# Patient Record
Sex: FEMALE | Race: ASIAN | NOT HISPANIC OR LATINO
[De-identification: names, ages, dates, MRNs, and addresses within clinical notes are randomized per-mention and may not be internally consistent; named-entity substitution may affect disease eponyms.]

---

## 2023-01-24 ENCOUNTER — ASOB RESULT (OUTPATIENT)
Age: 38
End: 2023-01-24

## 2023-01-24 ENCOUNTER — APPOINTMENT (OUTPATIENT)
Dept: OBGYN | Facility: CLINIC | Age: 38
End: 2023-01-24
Payer: COMMERCIAL

## 2023-01-24 VITALS
SYSTOLIC BLOOD PRESSURE: 122 MMHG | TEMPERATURE: 98.4 F | DIASTOLIC BLOOD PRESSURE: 76 MMHG | RESPIRATION RATE: 15 BRPM | BODY MASS INDEX: 22.82 KG/M2 | HEIGHT: 62 IN | WEIGHT: 124 LBS | HEART RATE: 73 BPM | OXYGEN SATURATION: 98 %

## 2023-01-24 DIAGNOSIS — Z80.7 FAMILY HISTORY OF OTHER MALIGNANT NEOPLASMS OF LYMPHOID, HEMATOPOIETIC AND RELATED TISSUES: ICD-10-CM

## 2023-01-24 PROCEDURE — 81025 URINE PREGNANCY TEST: CPT

## 2023-01-24 PROCEDURE — 36415 COLL VENOUS BLD VENIPUNCTURE: CPT

## 2023-01-24 PROCEDURE — 76817 TRANSVAGINAL US OBSTETRIC: CPT

## 2023-01-24 PROCEDURE — 0500F INITIAL PRENATAL CARE VISIT: CPT

## 2023-01-25 LAB
BASOPHILS # BLD AUTO: 0.02 K/UL
BASOPHILS NFR BLD AUTO: 0.3 %
EOSINOPHIL # BLD AUTO: 0.17 K/UL
EOSINOPHIL NFR BLD AUTO: 2.6 %
ESTIMATED AVERAGE GLUCOSE: 100 MG/DL
HBA1C MFR BLD HPLC: 5.1 %
HCG UR QL: POSITIVE
HCT VFR BLD CALC: 37.4 %
HGB BLD-MCNC: 12.7 G/DL
HIV1+2 AB SPEC QL IA.RAPID: NONREACTIVE
IMM GRANULOCYTES NFR BLD AUTO: 0.2 %
LYMPHOCYTES # BLD AUTO: 1.66 K/UL
LYMPHOCYTES NFR BLD AUTO: 25.7 %
MAN DIFF?: NORMAL
MCHC RBC-ENTMCNC: 30.5 PG
MCHC RBC-ENTMCNC: 34 GM/DL
MCV RBC AUTO: 89.9 FL
MONOCYTES # BLD AUTO: 0.47 K/UL
MONOCYTES NFR BLD AUTO: 7.3 %
NEUTROPHILS # BLD AUTO: 4.12 K/UL
NEUTROPHILS NFR BLD AUTO: 63.9 %
PLATELET # BLD AUTO: 296 K/UL
QUALITY CONTROL: YES
RBC # BLD: 4.16 M/UL
RBC # FLD: 13.3 %
WBC # FLD AUTO: 6.45 K/UL

## 2023-01-26 LAB
ABO + RH PNL BLD: NORMAL
BACTERIA UR CULT: NORMAL
BLD GP AB SCN SERPL QL: NORMAL
C TRACH RRNA SPEC QL NAA+PROBE: NOT DETECTED
HBV SURFACE AG SER QL: REACTIVE
HCV AB SER QL: NONREACTIVE
HCV S/CO RATIO: 0.63 S/CO
HGB A MFR BLD: 97.4 %
HGB A2 MFR BLD: 2.6 %
HGB FRACT BLD-IMP: NORMAL
HPV HIGH+LOW RISK DNA PNL CVX: NOT DETECTED
LEAD BLD-MCNC: <1 UG/DL
MEV IGG FLD QL IA: 18.6 AU/ML
MEV IGG+IGM SER-IMP: POSITIVE
N GONORRHOEA RRNA SPEC QL NAA+PROBE: NOT DETECTED
RUBV IGG FLD-ACNC: 1.5 INDEX
RUBV IGG SER-IMP: POSITIVE
SOURCE TP AMPLIFICATION: NORMAL
T PALLIDUM AB SER QL IA: NEGATIVE
VZV AB TITR SER: POSITIVE
VZV IGG SER IF-ACNC: 773.5 INDEX

## 2023-01-27 ENCOUNTER — TRANSCRIPTION ENCOUNTER (OUTPATIENT)
Age: 38
End: 2023-01-27

## 2023-01-30 LAB
CYTOLOGY CVX/VAG DOC THIN PREP: NORMAL
FMR1 GENE MUT ANL BLD/T: NORMAL

## 2023-01-31 LAB — AR GENE MUT ANL BLD/T: NORMAL

## 2023-02-07 ENCOUNTER — NON-APPOINTMENT (OUTPATIENT)
Age: 38
End: 2023-02-07

## 2023-02-07 ENCOUNTER — APPOINTMENT (OUTPATIENT)
Dept: OBGYN | Facility: CLINIC | Age: 38
End: 2023-02-07
Payer: COMMERCIAL

## 2023-02-07 ENCOUNTER — RESULT CHARGE (OUTPATIENT)
Age: 38
End: 2023-02-07

## 2023-02-07 VITALS
RESPIRATION RATE: 18 BRPM | BODY MASS INDEX: 23.37 KG/M2 | DIASTOLIC BLOOD PRESSURE: 60 MMHG | OXYGEN SATURATION: 98 % | HEART RATE: 62 BPM | SYSTOLIC BLOOD PRESSURE: 115 MMHG | TEMPERATURE: 97.9 F | WEIGHT: 127 LBS | HEIGHT: 62 IN

## 2023-02-07 LAB — CFTR MUT TESTED BLD/T: NEGATIVE

## 2023-02-07 PROCEDURE — 81003 URINALYSIS AUTO W/O SCOPE: CPT | Mod: QW

## 2023-02-07 PROCEDURE — 0502F SUBSEQUENT PRENATAL CARE: CPT

## 2023-02-07 PROCEDURE — 36415 COLL VENOUS BLD VENIPUNCTURE: CPT

## 2023-02-16 ENCOUNTER — ASOB RESULT (OUTPATIENT)
Age: 38
End: 2023-02-16

## 2023-02-16 ENCOUNTER — APPOINTMENT (OUTPATIENT)
Dept: ANTEPARTUM | Facility: CLINIC | Age: 38
End: 2023-02-16
Payer: COMMERCIAL

## 2023-02-16 PROCEDURE — 76801 OB US < 14 WKS SINGLE FETUS: CPT

## 2023-02-16 PROCEDURE — 76813 OB US NUCHAL MEAS 1 GEST: CPT | Mod: 59

## 2023-02-22 ENCOUNTER — APPOINTMENT (OUTPATIENT)
Dept: OBGYN | Facility: CLINIC | Age: 38
End: 2023-02-22
Payer: COMMERCIAL

## 2023-02-22 VITALS
RESPIRATION RATE: 16 BRPM | HEART RATE: 84 BPM | OXYGEN SATURATION: 96 % | BODY MASS INDEX: 23.92 KG/M2 | WEIGHT: 130 LBS | DIASTOLIC BLOOD PRESSURE: 74 MMHG | SYSTOLIC BLOOD PRESSURE: 116 MMHG | TEMPERATURE: 97.7 F | HEIGHT: 62 IN

## 2023-02-22 LAB
GLUCOSE UR-MCNC: NEGATIVE
KETONES UR-MCNC: NEGATIVE
LEUKOCYTE ESTERASE UR QL STRIP: NEGATIVE
NITRITE UR QL STRIP: NEGATIVE
PROT UR STRIP-MCNC: NEGATIVE

## 2023-02-22 PROCEDURE — 0502F SUBSEQUENT PRENATAL CARE: CPT

## 2023-03-07 ENCOUNTER — APPOINTMENT (OUTPATIENT)
Dept: OBGYN | Facility: CLINIC | Age: 38
End: 2023-03-07
Payer: COMMERCIAL

## 2023-03-07 VITALS
HEIGHT: 62 IN | WEIGHT: 129 LBS | SYSTOLIC BLOOD PRESSURE: 109 MMHG | DIASTOLIC BLOOD PRESSURE: 62 MMHG | BODY MASS INDEX: 23.74 KG/M2 | TEMPERATURE: 98 F | OXYGEN SATURATION: 98 % | RESPIRATION RATE: 18 BRPM | HEART RATE: 68 BPM

## 2023-03-07 DIAGNOSIS — Z34.91 ENCOUNTER FOR SUPERVISION OF NORMAL PREGNANCY, UNSPECIFIED, FIRST TRIMESTER: ICD-10-CM

## 2023-03-07 LAB
BILIRUB UR QL STRIP: NEGATIVE
CLARITY UR: CLEAR
COLLECTION METHOD: NORMAL
GLUCOSE UR-MCNC: NEGATIVE
HCG UR QL: 0.2 EU/DL
HGB UR QL STRIP.AUTO: NEGATIVE
KETONES UR-MCNC: NEGATIVE
LEUKOCYTE ESTERASE UR QL STRIP: NEGATIVE
NITRITE UR QL STRIP: NEGATIVE
PH UR STRIP: 7
PROT UR STRIP-MCNC: NEGATIVE
SP GR UR STRIP: 1.02

## 2023-03-07 PROCEDURE — 36415 COLL VENOUS BLD VENIPUNCTURE: CPT

## 2023-03-07 PROCEDURE — 0502F SUBSEQUENT PRENATAL CARE: CPT

## 2023-03-08 PROBLEM — Z34.91 PRENATAL CARE IN FIRST TRIMESTER: Status: RESOLVED | Noted: 2023-01-24 | Resolved: 2023-03-08

## 2023-03-10 LAB
AFP MOM: 1
AFP VALUE: 33.9 NG/ML
ALPHA FETOPROTEIN SERUM COMMENT: NORMAL
ALPHA FETOPROTEIN SERUM INTERPRETATION: NORMAL
ALPHA FETOPROTEIN SERUM RESULTS: NORMAL
ALPHA FETOPROTEIN SERUM TEST RESULTS: NORMAL
GESTATIONAL AGE BASED ON: NORMAL
GESTATIONAL AGE ON COLLECTION DATE: 15.1 WEEKS
INSULIN DEP DIABETES: NO
MATERNAL AGE AT EDD AFP: 38.6 YR
MULTIPLE GESTATION: NO
OSBR RISK 1 IN: NORMAL
RACE: NORMAL
WEIGHT AFP: 129 LBS

## 2023-03-28 ENCOUNTER — APPOINTMENT (OUTPATIENT)
Dept: HEPATOLOGY | Facility: CLINIC | Age: 38
End: 2023-03-28
Payer: COMMERCIAL

## 2023-03-28 VITALS
OXYGEN SATURATION: 96 % | TEMPERATURE: 97.9 F | HEART RATE: 66 BPM | RESPIRATION RATE: 15 BRPM | HEIGHT: 62 IN | WEIGHT: 136 LBS | DIASTOLIC BLOOD PRESSURE: 64 MMHG | SYSTOLIC BLOOD PRESSURE: 105 MMHG | BODY MASS INDEX: 25.03 KG/M2

## 2023-03-28 PROCEDURE — 99204 OFFICE O/P NEW MOD 45 MIN: CPT

## 2023-03-29 LAB
ALBUMIN SERPL ELPH-MCNC: 4.2 G/DL
ALP BLD-CCNC: 35 U/L
ALT SERPL-CCNC: 15 U/L
ANION GAP SERPL CALC-SCNC: 12 MMOL/L
AST SERPL-CCNC: 17 U/L
BASOPHILS # BLD AUTO: 0.03 K/UL
BASOPHILS NFR BLD AUTO: 0.5 %
BILIRUB DIRECT SERPL-MCNC: 0.1 MG/DL
BILIRUB INDIRECT SERPL-MCNC: 0.2 MG/DL
BILIRUB SERPL-MCNC: 0.3 MG/DL
BUN SERPL-MCNC: 8 MG/DL
CALCIUM SERPL-MCNC: 9.2 MG/DL
CHLORIDE SERPL-SCNC: 104 MMOL/L
CO2 SERPL-SCNC: 22 MMOL/L
CREAT SERPL-MCNC: 0.43 MG/DL
EGFR: 128 ML/MIN/1.73M2
EOSINOPHIL # BLD AUTO: 0.11 K/UL
EOSINOPHIL NFR BLD AUTO: 1.8 %
GLUCOSE SERPL-MCNC: 92 MG/DL
HBV CORE IGG+IGM SER QL: REACTIVE
HBV DNA # SERPL NAA+PROBE: 10 IU/ML
HBV SURFACE AB SER QL: NONREACTIVE
HBV SURFACE AG SER QL: REACTIVE
HCT VFR BLD CALC: 35.8 %
HCV AB SER QL: NONREACTIVE
HCV S/CO RATIO: 0.57 S/CO
HEPB DNA PCR INT: DETECTED
HEPB DNA PCR LOG: 1 LOGIU/ML
HGB BLD-MCNC: 12 G/DL
IMM GRANULOCYTES NFR BLD AUTO: 0.3 %
INR PPP: 0.84 RATIO
LYMPHOCYTES # BLD AUTO: 1.25 K/UL
LYMPHOCYTES NFR BLD AUTO: 20.6 %
MAN DIFF?: NORMAL
MCHC RBC-ENTMCNC: 31.4 PG
MCHC RBC-ENTMCNC: 33.5 GM/DL
MCV RBC AUTO: 93.7 FL
MONOCYTES # BLD AUTO: 0.35 K/UL
MONOCYTES NFR BLD AUTO: 5.8 %
NEUTROPHILS # BLD AUTO: 4.31 K/UL
NEUTROPHILS NFR BLD AUTO: 71 %
PLATELET # BLD AUTO: 255 K/UL
POTASSIUM SERPL-SCNC: 3.9 MMOL/L
PROT SERPL-MCNC: 6.4 G/DL
PT BLD: 9.9 SEC
RBC # BLD: 3.82 M/UL
RBC # FLD: 12.9 %
SODIUM SERPL-SCNC: 137 MMOL/L
WBC # FLD AUTO: 6.07 K/UL

## 2023-03-30 LAB
HBV E AB SER QL: REACTIVE
HBV E AG SER QL: NONREACTIVE
HCV RNA SERPL NAA+PROBE-LOG IU: NOT DETECTED LOGIU/ML
HEPC RNA INTERP: NOT DETECTED

## 2023-03-31 NOTE — PHYSICAL EXAM
[General Appearance - Alert] : alert [General Appearance - In No Acute Distress] : in no acute distress [General Appearance - Well Nourished] : well nourished [General Appearance - Well Developed] : well developed [Sclera] : the sclera and conjunctiva were normal [Oropharynx] : the oropharynx was normal [Neck Appearance] : the appearance of the neck was normal [Respiration, Rhythm And Depth] : normal respiratory rhythm and effort [Exaggerated Use Of Accessory Muscles For Inspiration] : no accessory muscle use [Auscultation Breath Sounds / Voice Sounds] : lungs were clear to auscultation bilaterally [Heart Rate And Rhythm] : heart rate was normal and rhythm regular [Heart Sounds] : normal S1 and S2 [Edema] : there was no peripheral edema [Bowel Sounds] : normal bowel sounds [Abdomen Soft] : soft [Abdomen Tenderness] : non-tender [] : no hepato-splenomegaly [Cervical Lymph Nodes Enlarged Posterior Bilaterally] : posterior cervical [Cervical Lymph Nodes Enlarged Anterior Bilaterally] : anterior cervical [Supraclavicular Lymph Nodes Enlarged Bilaterally] : supraclavicular [Axillary Lymph Nodes Enlarged Bilaterally] : axillary [No CVA Tenderness] : no ~M costovertebral angle tenderness [No Spinal Tenderness] : no spinal tenderness [Abnormal Walk] : normal gait [Skin Color & Pigmentation] : normal skin color and pigmentation [Oriented To Time, Place, And Person] : oriented to person, place, and time [Impaired Insight] : insight and judgment were intact [Affect] : the affect was normal [Mood] : the mood was normal [Scleral Icterus] : No Scleral Icterus [Spider Angioma] : No spider angioma(s) were observed [Abdominal  Ascites] : no ascites [Liver Palpable ___ Finger Breadths Below Costal Margin] : was not palpable below costal margin [Asterixis] : no asterixis observed [Jaundice] : No jaundice [Palmar Erythema] : no Palmar Erythema [Depression] : no depression [FreeTextEntry1] : Grossly intact

## 2023-03-31 NOTE — HISTORY OF PRESENT ILLNESS
[FreeTextEntry1] : 38 yo Female with Hx of chronic Hepatitis B (Dx at birth, mother has Hep B), reported that has been treated in her early 20s (for few month, was oral medication), prediabetes, prior exposure to hep C ? (now Hep C ab neg 1/24/23), was referred by Dr. Ellis for her Hep B. She is currently 18 weeks pregnant. \par First pregnancy. `s vaccination status unknown.

## 2023-03-31 NOTE — REVIEW OF SYSTEMS
[Negative] : Heme/Lymph [Fever] : no fever [Chills] : no chills [Feeling Poorly] : not feeling poorly [Feeling Tired] : not feeling tired [Dysuria] : no dysuria [Arthralgias] : no arthralgias [Itching] : no itching

## 2023-03-31 NOTE — ASSESSMENT
[FreeTextEntry1] : 36 yo Female with Hx of chronic Hepatitis B (Dx at birth, mother has Hep B), reported that has been treated in her early 20s (for few month, was oral medication), prediabetes, prior exposure to hep C ? (now Hep C ab neg 1/24/23), was referred by Dr. Ellis for her Hep B. She is currently 18 weeks pregnant. \par \par # Chronic Hep B\par - I have explained to the patient the natural history of hepatitis B virus infection including the potential progression to cirrhosis and risk of HCC. \par - I have also discussed the current FDA approved hepatitis B treatment options, drug and drug interaction, risks and benefits, side effects and criteria of the treatment.\par - Discussed that in general in a Hepatitis B e ab positive person, elevated liver enzymes (>2x ULN) along with HBV DNA viral load > 2000 IU/ml would meet criteria for treatment. Will check her HBeAb and HBeAg status. Discussed that if one does not meet standard criteria for treatment, in pregnancy the HBV DNA viral load threshold to treat in 3rd trimester is 200.000 IU/ml to reduce risk of transmission.\par \par - I have ordered blood work to check complete Hepatitis B serology, HBV DNA.\par - HIV neg (2023), HCV ab neg (2023), \par \par - Fibroscan to assess for fibrosis / cirrhosis is not feasible during pregnancy, will do after delivery.\par - AFP would not be informative during pregnancy, could be falsely elevated. Will obtain post delivery. \par - Will obtain US abd for now. \par \par - Discussed that the child will need Hep B vaccine and HBIG within 12 hrs from birth to reduce risk of transmission.\par \par - Discussed that postpartum monitoring will be important too, not just for HCC screening, but also because sometimes Hep B flare can occur post partum. \par \par - Patient has been counseled on prevention of HBV transmission, including but not limited to: not sharing toothbrushes, razors, injection equipment, glucose testing equipment, covering open cuts and scratches, using barrier protection (if sexual partner not immune), testing household and sexual contacts and vaccinating if not immune, not donating blood, organs. \par - Advised to check `s Hep B /  vaccination status. \par \par - Patient meantime try to get prior records.\par \par RTC 1 month and will repeat LFTs and VL again towards the end of 2nd trimester..

## 2023-03-31 NOTE — CONSULT LETTER
[Consult Letter:] : I had the pleasure of evaluating your patient, [unfilled]. [Consult Closing:] : Thank you very much for allowing me to participate in the care of this patient.  If you have any questions, please do not hesitate to contact me. [FreeTextEntry2] : Dr. Ramiro Ellis

## 2023-04-03 LAB — HEPATITIS D VIRUS IGM AB: NORMAL

## 2023-04-18 ENCOUNTER — APPOINTMENT (OUTPATIENT)
Dept: ANTEPARTUM | Facility: CLINIC | Age: 38
End: 2023-04-18
Payer: COMMERCIAL

## 2023-04-18 ENCOUNTER — ASOB RESULT (OUTPATIENT)
Age: 38
End: 2023-04-18

## 2023-04-18 PROCEDURE — 76811 OB US DETAILED SNGL FETUS: CPT

## 2023-04-21 ENCOUNTER — APPOINTMENT (OUTPATIENT)
Dept: PEDIATRIC CARDIOLOGY | Facility: CLINIC | Age: 38
End: 2023-04-21
Payer: COMMERCIAL

## 2023-04-21 PROCEDURE — 76820 UMBILICAL ARTERY ECHO: CPT

## 2023-04-21 PROCEDURE — 93325 DOPPLER ECHO COLOR FLOW MAPG: CPT | Mod: 59

## 2023-04-21 PROCEDURE — 76821 MIDDLE CEREBRAL ARTERY ECHO: CPT

## 2023-04-21 PROCEDURE — 76825 ECHO EXAM OF FETAL HEART: CPT

## 2023-04-21 PROCEDURE — 99203 OFFICE O/P NEW LOW 30 MIN: CPT

## 2023-04-21 PROCEDURE — 76827 ECHO EXAM OF FETAL HEART: CPT

## 2023-05-01 ENCOUNTER — APPOINTMENT (OUTPATIENT)
Dept: OBGYN | Facility: CLINIC | Age: 38
End: 2023-05-01
Payer: COMMERCIAL

## 2023-05-01 VITALS
TEMPERATURE: 98.3 F | HEART RATE: 76 BPM | DIASTOLIC BLOOD PRESSURE: 71 MMHG | SYSTOLIC BLOOD PRESSURE: 118 MMHG | WEIGHT: 139 LBS | OXYGEN SATURATION: 97 % | BODY MASS INDEX: 25.58 KG/M2 | RESPIRATION RATE: 18 BRPM | HEIGHT: 62 IN

## 2023-05-01 LAB
BILIRUB UR QL STRIP: NEGATIVE
GLUCOSE UR-MCNC: NEGATIVE
HCG UR QL: 0.2 EU/DL
HGB UR QL STRIP.AUTO: NEGATIVE
KETONES UR-MCNC: NEGATIVE
LEUKOCYTE ESTERASE UR QL STRIP: NEGATIVE
NITRITE UR QL STRIP: NEGATIVE
PH UR STRIP: 6
PROT UR STRIP-MCNC: NEGATIVE
SP GR UR STRIP: 1.02

## 2023-05-01 PROCEDURE — 0502F SUBSEQUENT PRENATAL CARE: CPT

## 2023-05-16 ENCOUNTER — ASOB RESULT (OUTPATIENT)
Age: 38
End: 2023-05-16

## 2023-05-16 ENCOUNTER — APPOINTMENT (OUTPATIENT)
Dept: ANTEPARTUM | Facility: CLINIC | Age: 38
End: 2023-05-16
Payer: COMMERCIAL

## 2023-05-16 PROCEDURE — 76816 OB US FOLLOW-UP PER FETUS: CPT

## 2023-05-24 ENCOUNTER — APPOINTMENT (OUTPATIENT)
Dept: OBGYN | Facility: CLINIC | Age: 38
End: 2023-05-24
Payer: COMMERCIAL

## 2023-05-24 VITALS
BODY MASS INDEX: 26.13 KG/M2 | HEIGHT: 62 IN | WEIGHT: 142 LBS | RESPIRATION RATE: 18 BRPM | TEMPERATURE: 97.9 F | HEART RATE: 80 BPM | DIASTOLIC BLOOD PRESSURE: 71 MMHG | OXYGEN SATURATION: 98 % | SYSTOLIC BLOOD PRESSURE: 109 MMHG

## 2023-05-24 PROCEDURE — 36415 COLL VENOUS BLD VENIPUNCTURE: CPT

## 2023-05-24 PROCEDURE — 0502F SUBSEQUENT PRENATAL CARE: CPT

## 2023-05-25 LAB — GLUCOSE 1H P 50 G GLC PO SERPL-MCNC: 112 MG/DL

## 2023-06-20 ENCOUNTER — APPOINTMENT (OUTPATIENT)
Dept: OBGYN | Facility: CLINIC | Age: 38
End: 2023-06-20
Payer: COMMERCIAL

## 2023-06-20 VITALS
TEMPERATURE: 97.2 F | WEIGHT: 147 LBS | RESPIRATION RATE: 18 BRPM | BODY MASS INDEX: 27.05 KG/M2 | SYSTOLIC BLOOD PRESSURE: 111 MMHG | OXYGEN SATURATION: 99 % | HEART RATE: 86 BPM | HEIGHT: 62 IN | DIASTOLIC BLOOD PRESSURE: 51 MMHG

## 2023-06-20 DIAGNOSIS — Z00.00 ENCOUNTER FOR GENERAL ADULT MEDICAL EXAMINATION W/OUT ABNORMAL FINDINGS: ICD-10-CM

## 2023-06-20 LAB
BILIRUB UR QL STRIP: NEGATIVE
GLUCOSE UR-MCNC: NEGATIVE
HCG UR QL: 0.2 EU/DL
HGB UR QL STRIP.AUTO: NEGATIVE
KETONES UR-MCNC: NEGATIVE
LEUKOCYTE ESTERASE UR QL STRIP: NORMAL
NITRITE UR QL STRIP: NEGATIVE
PH UR STRIP: 7
PROT UR STRIP-MCNC: NEGATIVE
SP GR UR STRIP: 1.01

## 2023-06-20 PROCEDURE — 0502F SUBSEQUENT PRENATAL CARE: CPT

## 2023-06-21 ENCOUNTER — NON-APPOINTMENT (OUTPATIENT)
Age: 38
End: 2023-06-21

## 2023-06-21 ENCOUNTER — ASOB RESULT (OUTPATIENT)
Age: 38
End: 2023-06-21

## 2023-06-21 ENCOUNTER — APPOINTMENT (OUTPATIENT)
Dept: ANTEPARTUM | Facility: CLINIC | Age: 38
End: 2023-06-21
Payer: COMMERCIAL

## 2023-06-21 PROCEDURE — 99204 OFFICE O/P NEW MOD 45 MIN: CPT

## 2023-06-21 PROCEDURE — 76819 FETAL BIOPHYS PROFIL W/O NST: CPT | Mod: 59

## 2023-06-21 PROCEDURE — 76816 OB US FOLLOW-UP PER FETUS: CPT

## 2023-06-21 PROCEDURE — 76820 UMBILICAL ARTERY ECHO: CPT | Mod: 59

## 2023-06-28 ENCOUNTER — APPOINTMENT (OUTPATIENT)
Dept: HEPATOLOGY | Facility: CLINIC | Age: 38
End: 2023-06-28

## 2023-07-05 ENCOUNTER — NON-APPOINTMENT (OUTPATIENT)
Age: 38
End: 2023-07-05

## 2023-07-05 ENCOUNTER — APPOINTMENT (OUTPATIENT)
Dept: OBGYN | Facility: CLINIC | Age: 38
End: 2023-07-05
Payer: COMMERCIAL

## 2023-07-05 VITALS
HEART RATE: 84 BPM | HEIGHT: 62 IN | TEMPERATURE: 98.1 F | RESPIRATION RATE: 18 BRPM | WEIGHT: 147 LBS | BODY MASS INDEX: 27.05 KG/M2 | SYSTOLIC BLOOD PRESSURE: 113 MMHG | OXYGEN SATURATION: 97 % | DIASTOLIC BLOOD PRESSURE: 58 MMHG

## 2023-07-05 DIAGNOSIS — Z34.92 ENCOUNTER FOR SUPERVISION OF NORMAL PREGNANCY, UNSPECIFIED, SECOND TRIMESTER: ICD-10-CM

## 2023-07-05 DIAGNOSIS — D27.1 BENIGN NEOPLASM OF LEFT OVARY: ICD-10-CM

## 2023-07-05 LAB
BILIRUB UR QL STRIP: NEGATIVE
GLUCOSE UR-MCNC: NEGATIVE
HCG UR QL: 0.2 EU/DL
HGB UR QL STRIP.AUTO: NEGATIVE
KETONES UR-MCNC: NEGATIVE
LEUKOCYTE ESTERASE UR QL STRIP: NEGATIVE
NITRITE UR QL STRIP: NEGATIVE
PH UR STRIP: 7
PROT UR STRIP-MCNC: NEGATIVE
SP GR UR STRIP: 1.01

## 2023-07-05 PROCEDURE — 0502F SUBSEQUENT PRENATAL CARE: CPT

## 2023-07-11 ENCOUNTER — APPOINTMENT (OUTPATIENT)
Dept: HEPATOLOGY | Facility: CLINIC | Age: 38
End: 2023-07-11
Payer: COMMERCIAL

## 2023-07-11 ENCOUNTER — APPOINTMENT (OUTPATIENT)
Dept: OBGYN | Facility: CLINIC | Age: 38
End: 2023-07-11
Payer: COMMERCIAL

## 2023-07-11 ENCOUNTER — NON-APPOINTMENT (OUTPATIENT)
Age: 38
End: 2023-07-11

## 2023-07-11 ENCOUNTER — ASOB RESULT (OUTPATIENT)
Age: 38
End: 2023-07-11

## 2023-07-11 VITALS
OXYGEN SATURATION: 99 % | RESPIRATION RATE: 18 BRPM | DIASTOLIC BLOOD PRESSURE: 71 MMHG | HEIGHT: 62 IN | TEMPERATURE: 98.7 F | SYSTOLIC BLOOD PRESSURE: 112 MMHG | WEIGHT: 147 LBS | HEART RATE: 82 BPM | BODY MASS INDEX: 27.05 KG/M2

## 2023-07-11 VITALS
HEIGHT: 62 IN | BODY MASS INDEX: 27.23 KG/M2 | TEMPERATURE: 98.3 F | SYSTOLIC BLOOD PRESSURE: 119 MMHG | DIASTOLIC BLOOD PRESSURE: 73 MMHG | OXYGEN SATURATION: 98 % | HEART RATE: 74 BPM | WEIGHT: 148 LBS

## 2023-07-11 LAB
BILIRUB UR QL STRIP: NEGATIVE
CLARITY UR: CLEAR
COLLECTION METHOD: NORMAL
GLUCOSE UR-MCNC: NEGATIVE
HCG UR QL: 0.2 EU/DL
HGB UR QL STRIP.AUTO: NORMAL
KETONES UR-MCNC: NEGATIVE
LEUKOCYTE ESTERASE UR QL STRIP: NEGATIVE
NITRITE UR QL STRIP: NEGATIVE
PH UR STRIP: 7
PROT UR STRIP-MCNC: NEGATIVE
SP GR UR STRIP: 1.01

## 2023-07-11 PROCEDURE — 0502F SUBSEQUENT PRENATAL CARE: CPT

## 2023-07-11 PROCEDURE — 76817 TRANSVAGINAL US OBSTETRIC: CPT

## 2023-07-11 PROCEDURE — 99214 OFFICE O/P EST MOD 30 MIN: CPT

## 2023-07-12 ENCOUNTER — ASOB RESULT (OUTPATIENT)
Age: 38
End: 2023-07-12

## 2023-07-12 ENCOUNTER — APPOINTMENT (OUTPATIENT)
Dept: ANTEPARTUM | Facility: CLINIC | Age: 38
End: 2023-07-12
Payer: COMMERCIAL

## 2023-07-12 LAB
ALBUMIN SERPL ELPH-MCNC: 3.9 G/DL
ALP BLD-CCNC: 65 U/L
ALT SERPL-CCNC: 14 U/L
ANION GAP SERPL CALC-SCNC: 11 MMOL/L
AST SERPL-CCNC: 17 U/L
BILIRUB DIRECT SERPL-MCNC: 0.1 MG/DL
BILIRUB INDIRECT SERPL-MCNC: 0.2 MG/DL
BILIRUB SERPL-MCNC: 0.3 MG/DL
BUN SERPL-MCNC: 5 MG/DL
CALCIUM SERPL-MCNC: 9.2 MG/DL
CHLORIDE SERPL-SCNC: 103 MMOL/L
CO2 SERPL-SCNC: 23 MMOL/L
CREAT SERPL-MCNC: 0.48 MG/DL
EGFR: 124 ML/MIN/1.73M2
GLUCOSE SERPL-MCNC: 91 MG/DL
HAV IGM SER QL: NONREACTIVE
HBV DNA # SERPL NAA+PROBE: <10 IU/ML
HEPATITIS A IGG ANTIBODY: REACTIVE
HEPB DNA PCR INT: DETECTED
HEPB DNA PCR LOG: <1 LOGIU/ML
INR PPP: 0.86 RATIO
POTASSIUM SERPL-SCNC: 4.4 MMOL/L
PROT SERPL-MCNC: 6 G/DL
PT BLD: 10.1 SEC
SODIUM SERPL-SCNC: 137 MMOL/L

## 2023-07-12 PROCEDURE — 76819 FETAL BIOPHYS PROFIL W/O NST: CPT | Mod: 59

## 2023-07-12 PROCEDURE — 76816 OB US FOLLOW-UP PER FETUS: CPT

## 2023-07-13 NOTE — ASSESSMENT
[FreeTextEntry1] : 38 yo Female with Hx of chronic Hepatitis B (Dx at birth, mother has Hep B), reported that has been treated in her early 20s (for few month, was oral medication), prediabetes, prior exposure to hep C ? (now Hep C ab neg 1/24/23), was referred by Dr. Ellis for her Hep B and was seen for initial visit on 3/28/23 at 18 weeks of her pregnancy (1st pregnancy). She has normal ALT, low HBV DNA. \par \par # Chronic Hep B, eAb pos.\par - I have explained to the patient the natural history of hepatitis B virus infection including the potential progression to cirrhosis and risk of HCC. \par - I have also discussed the current FDA approved hepatitis B treatment options, drug and drug interaction, risks and benefits, side effects and criteria of the treatment.\par - Discussed that in general in a Hepatitis B e ab positive person, elevated liver enzymes (>2x ULN) along with HBV DNA viral load > 2000 IU/ml would meet criteria for treatment.  Discussed that if one does not meet standard criteria for treatment, in pregnancy the HBV DNA viral load threshold to treat in 3rd trimester is 200.000 IU/ml to reduce risk of transmission. She does not meet criteria for antiviral treatment at present. \par \par - I have ordered repeat blood work to check LFTs and HBV DNA.\par - HIV neg (2023), HCV ab neg (2023), Hep D IgM neg (2023)\par - Will check Hep A immunity.\par \par - Fibroscan to assess for fibrosis / cirrhosis is not feasible during pregnancy, will do after delivery.\par - AFP would not be informative during pregnancy, could be falsely elevated. Will obtain post delivery. \par - Patient to schedule the previously ordered US abd. \par \par - Discussed that the child will need Hep B vaccine and HBIG within 12 hrs from birth to reduce risk of transmission.\par \par - Discussed that postpartum monitoring will be important too, not just for HCC screening, but also because sometimes Hep B flare can occur post partum. \par \par - Patient has been counseled on prevention of HBV transmission, including but not limited to: not sharing toothbrushes, razors, injection equipment, glucose testing equipment, covering open cuts and scratches, using barrier protection (if sexual partner not immune), testing household and sexual contacts and vaccinating if not immune, not donating blood, organs. \par - Advised to check `s Hep B /  vaccination status. \par \par - Patient still try to get prior records.\par \par \par RTC after delivery and patient to call to discuss results, as well as return earlier if change in status.

## 2023-07-13 NOTE — REASON FOR VISIT
[Initial Evaluation] : an initial evaluation [Follow-Up: _____] : a [unfilled] follow-up visit [FreeTextEntry1] : Hepatitis B

## 2023-07-13 NOTE — REVIEW OF SYSTEMS
[Negative] : Heme/Lymph [Fever] : no fever [Chills] : no chills [Feeling Poorly] : not feeling poorly [Feeling Tired] : not feeling tired [Dysuria] : no dysuria [Arthralgias] : no arthralgias [Itching] : no itching [Confused] : no confusion

## 2023-07-13 NOTE — HISTORY OF PRESENT ILLNESS
[FreeTextEntry1] : 36 yo Female with Hx of chronic Hepatitis B (Dx at birth, mother has Hep B), reported that has been treated in her early 20s (for few month, was oral medication), prediabetes, prior exposure to hep C ? (now Hep C ab neg 1/24/23), was referred by Dr. Ellis for her Hep B and was seen for initial visit on 3/28/23 at 18 weeks of her pregnancy (1st pregnancy). \par \par Labs 3/28/23 showed normal CBC, normal ALT (15), low ALP (35), low HBV DNA  10IU/ml, HBeAb pos, Hep D IgM neg, HCV ab neg and HIV neg (1/24/23). \par \par She is here for follow up. Reports feeling well, no new complaints. She has not done the US abd yet. She is at 33 weeks of pregnancy. \par

## 2023-07-19 ENCOUNTER — NON-APPOINTMENT (OUTPATIENT)
Age: 38
End: 2023-07-19

## 2023-07-19 ENCOUNTER — APPOINTMENT (OUTPATIENT)
Dept: OBGYN | Facility: CLINIC | Age: 38
End: 2023-07-19
Payer: COMMERCIAL

## 2023-07-19 VITALS
SYSTOLIC BLOOD PRESSURE: 108 MMHG | HEIGHT: 62 IN | WEIGHT: 148 LBS | TEMPERATURE: 98.11 F | OXYGEN SATURATION: 98 % | DIASTOLIC BLOOD PRESSURE: 68 MMHG | RESPIRATION RATE: 18 BRPM | HEART RATE: 105 BPM | BODY MASS INDEX: 27.23 KG/M2

## 2023-07-19 LAB
BILIRUB UR QL STRIP: NEGATIVE
CLARITY UR: CLEAR
COLLECTION METHOD: NORMAL
GLUCOSE UR-MCNC: NEGATIVE
HCG UR QL: 0.2 EU/DL
HGB UR QL STRIP.AUTO: NEGATIVE
KETONES UR-MCNC: NORMAL
LEUKOCYTE ESTERASE UR QL STRIP: NORMAL
NITRITE UR QL STRIP: NEGATIVE
PH UR STRIP: 6.5
PROT UR STRIP-MCNC: NEGATIVE
SP GR UR STRIP: 1.02

## 2023-07-19 PROCEDURE — 0502F SUBSEQUENT PRENATAL CARE: CPT

## 2023-07-31 ENCOUNTER — APPOINTMENT (OUTPATIENT)
Dept: OBGYN | Facility: CLINIC | Age: 38
End: 2023-07-31
Payer: COMMERCIAL

## 2023-07-31 VITALS
RESPIRATION RATE: 18 BRPM | OXYGEN SATURATION: 97 % | DIASTOLIC BLOOD PRESSURE: 56 MMHG | TEMPERATURE: 97.9 F | BODY MASS INDEX: 27.42 KG/M2 | HEART RATE: 89 BPM | HEIGHT: 62 IN | SYSTOLIC BLOOD PRESSURE: 102 MMHG | WEIGHT: 149 LBS

## 2023-07-31 LAB
BILIRUB UR QL STRIP: NEGATIVE
GLUCOSE UR-MCNC: NEGATIVE
HCG UR QL: 0.2 EU/DL
HGB UR QL STRIP.AUTO: NEGATIVE
KETONES UR-MCNC: NEGATIVE
LEUKOCYTE ESTERASE UR QL STRIP: NEGATIVE
NITRITE UR QL STRIP: NEGATIVE
PH UR STRIP: 7.5
PROT UR STRIP-MCNC: NEGATIVE
SP GR UR STRIP: 1.02

## 2023-07-31 PROCEDURE — 36415 COLL VENOUS BLD VENIPUNCTURE: CPT

## 2023-07-31 PROCEDURE — 0502F SUBSEQUENT PRENATAL CARE: CPT

## 2023-08-08 ENCOUNTER — ASOB RESULT (OUTPATIENT)
Age: 38
End: 2023-08-08

## 2023-08-08 ENCOUNTER — APPOINTMENT (OUTPATIENT)
Dept: ANTEPARTUM | Facility: CLINIC | Age: 38
End: 2023-08-08
Payer: COMMERCIAL

## 2023-08-08 PROCEDURE — 76816 OB US FOLLOW-UP PER FETUS: CPT

## 2023-08-09 LAB
C TRACH RRNA SPEC QL NAA+PROBE: NOT DETECTED
GP B STREP DNA SPEC QL NAA+PROBE: NOT DETECTED
HIV1+2 AB SPEC QL IA.RAPID: NONREACTIVE
N GONORRHOEA RRNA SPEC QL NAA+PROBE: NOT DETECTED
SOURCE AMPLIFICATION: NORMAL
SOURCE GBS: NORMAL
T PALLIDUM AB SER QL IA: NEGATIVE

## 2023-08-10 ENCOUNTER — NON-APPOINTMENT (OUTPATIENT)
Age: 38
End: 2023-08-10

## 2023-08-10 ENCOUNTER — APPOINTMENT (OUTPATIENT)
Dept: OBGYN | Facility: CLINIC | Age: 38
End: 2023-08-10
Payer: COMMERCIAL

## 2023-08-10 VITALS
HEIGHT: 62 IN | WEIGHT: 151 LBS | RESPIRATION RATE: 18 BRPM | TEMPERATURE: 98.2 F | SYSTOLIC BLOOD PRESSURE: 114 MMHG | HEART RATE: 87 BPM | BODY MASS INDEX: 27.79 KG/M2 | OXYGEN SATURATION: 97 % | DIASTOLIC BLOOD PRESSURE: 74 MMHG

## 2023-08-10 PROCEDURE — 0502F SUBSEQUENT PRENATAL CARE: CPT

## 2023-08-11 LAB
BILIRUB UR QL STRIP: NEGATIVE
CLARITY UR: CLEAR
COLLECTION METHOD: NORMAL
GLUCOSE UR-MCNC: NEGATIVE
HCG UR QL: 0.2 EU/DL
HGB UR QL STRIP.AUTO: NEGATIVE
KETONES UR-MCNC: NEGATIVE
LEUKOCYTE ESTERASE UR QL STRIP: NEGATIVE
NITRITE UR QL STRIP: NEGATIVE
PH UR STRIP: 7.5
PROT UR STRIP-MCNC: NEGATIVE
SP GR UR STRIP: 1.02

## 2023-08-15 ENCOUNTER — APPOINTMENT (OUTPATIENT)
Dept: ULTRASOUND IMAGING | Facility: CLINIC | Age: 38
End: 2023-08-15
Payer: COMMERCIAL

## 2023-08-15 ENCOUNTER — OUTPATIENT (OUTPATIENT)
Dept: OUTPATIENT SERVICES | Facility: HOSPITAL | Age: 38
LOS: 1 days | End: 2023-08-15
Payer: COMMERCIAL

## 2023-08-15 DIAGNOSIS — B18.1 CHRONIC VIRAL HEPATITIS B WITHOUT DELTA-AGENT: ICD-10-CM

## 2023-08-15 PROCEDURE — 76700 US EXAM ABDOM COMPLETE: CPT

## 2023-08-15 PROCEDURE — 76700 US EXAM ABDOM COMPLETE: CPT | Mod: 26

## 2023-08-16 ENCOUNTER — APPOINTMENT (OUTPATIENT)
Dept: OBGYN | Facility: CLINIC | Age: 38
End: 2023-08-16
Payer: COMMERCIAL

## 2023-08-16 ENCOUNTER — ASOB RESULT (OUTPATIENT)
Age: 38
End: 2023-08-16

## 2023-08-16 VITALS
HEIGHT: 62 IN | DIASTOLIC BLOOD PRESSURE: 70 MMHG | TEMPERATURE: 98.1 F | BODY MASS INDEX: 27.97 KG/M2 | OXYGEN SATURATION: 97 % | WEIGHT: 152 LBS | RESPIRATION RATE: 18 BRPM | HEART RATE: 78 BPM | SYSTOLIC BLOOD PRESSURE: 106 MMHG

## 2023-08-16 LAB
BILIRUB UR QL STRIP: NEGATIVE
GLUCOSE UR-MCNC: NEGATIVE
HCG UR QL: 0.2 EU/DL
HGB UR QL STRIP.AUTO: NORMAL
KETONES UR-MCNC: NEGATIVE
LEUKOCYTE ESTERASE UR QL STRIP: NEGATIVE
NITRITE UR QL STRIP: NEGATIVE
PH UR STRIP: 7
PROT UR STRIP-MCNC: NEGATIVE
SP GR UR STRIP: 1.01

## 2023-08-16 PROCEDURE — 0502F SUBSEQUENT PRENATAL CARE: CPT

## 2023-08-16 PROCEDURE — 76818 FETAL BIOPHYS PROFILE W/NST: CPT

## 2023-08-21 ENCOUNTER — NON-APPOINTMENT (OUTPATIENT)
Age: 38
End: 2023-08-21

## 2023-08-21 ENCOUNTER — APPOINTMENT (OUTPATIENT)
Dept: OBGYN | Facility: CLINIC | Age: 38
End: 2023-08-21
Payer: COMMERCIAL

## 2023-08-21 ENCOUNTER — ASOB RESULT (OUTPATIENT)
Age: 38
End: 2023-08-21

## 2023-08-21 VITALS
SYSTOLIC BLOOD PRESSURE: 105 MMHG | WEIGHT: 150 LBS | TEMPERATURE: 98.2 F | HEART RATE: 77 BPM | HEIGHT: 62 IN | BODY MASS INDEX: 27.6 KG/M2 | DIASTOLIC BLOOD PRESSURE: 68 MMHG | RESPIRATION RATE: 18 BRPM | OXYGEN SATURATION: 97 %

## 2023-08-21 LAB
BILIRUB UR QL STRIP: NEGATIVE
GLUCOSE UR-MCNC: NEGATIVE
HCG UR QL: 0.2 EU/DL
HGB UR QL STRIP.AUTO: NEGATIVE
KETONES UR-MCNC: NEGATIVE
LEUKOCYTE ESTERASE UR QL STRIP: NORMAL
NITRITE UR QL STRIP: NEGATIVE
PH UR STRIP: 7.5
PROT UR STRIP-MCNC: NEGATIVE
SP GR UR STRIP: 1.01

## 2023-08-21 PROCEDURE — 76818 FETAL BIOPHYS PROFILE W/NST: CPT

## 2023-08-21 PROCEDURE — 0502F SUBSEQUENT PRENATAL CARE: CPT

## 2023-08-24 ENCOUNTER — TRANSCRIPTION ENCOUNTER (OUTPATIENT)
Age: 38
End: 2023-08-24

## 2023-08-24 ENCOUNTER — INPATIENT (INPATIENT)
Facility: HOSPITAL | Age: 38
LOS: 3 days | Discharge: HOME | End: 2023-08-28
Attending: OBSTETRICS & GYNECOLOGY | Admitting: OBSTETRICS & GYNECOLOGY
Payer: COMMERCIAL

## 2023-08-24 DIAGNOSIS — Z34.80 ENCOUNTER FOR SUPERVISION OF OTHER NORMAL PREGNANCY, UNSPECIFIED TRIMESTER: ICD-10-CM

## 2023-08-24 DIAGNOSIS — Z3A.00 WEEKS OF GESTATION OF PREGNANCY NOT SPECIFIED: ICD-10-CM

## 2023-08-24 DIAGNOSIS — O26.899 OTHER SPECIFIED PREGNANCY RELATED CONDITIONS, UNSPECIFIED TRIMESTER: ICD-10-CM

## 2023-08-24 LAB
APTT BLD: 31.4 SEC — SIGNIFICANT CHANGE UP (ref 24.5–35.6)
BASOPHILS # BLD AUTO: 0.02 K/UL — SIGNIFICANT CHANGE UP (ref 0–0.2)
BASOPHILS NFR BLD AUTO: 0.3 % — SIGNIFICANT CHANGE UP (ref 0–2)
EOSINOPHIL # BLD AUTO: 0.08 K/UL — SIGNIFICANT CHANGE UP (ref 0–0.5)
EOSINOPHIL NFR BLD AUTO: 1.2 % — SIGNIFICANT CHANGE UP (ref 0–6)
FIBRINOGEN PPP-MCNC: 325 MG/DL — SIGNIFICANT CHANGE UP (ref 200–475)
HCT VFR BLD CALC: 38.2 % — SIGNIFICANT CHANGE UP (ref 34.5–45)
HGB BLD-MCNC: 12.8 G/DL — SIGNIFICANT CHANGE UP (ref 11.5–15.5)
IMM GRANULOCYTES NFR BLD AUTO: 0.4 % — SIGNIFICANT CHANGE UP (ref 0–0.9)
INR BLD: 0.78 RATIO — LOW (ref 0.85–1.18)
LYMPHOCYTES # BLD AUTO: 1.11 K/UL — SIGNIFICANT CHANGE UP (ref 1–3.3)
LYMPHOCYTES # BLD AUTO: 16.6 % — SIGNIFICANT CHANGE UP (ref 13–44)
MCHC RBC-ENTMCNC: 30.5 PG — SIGNIFICANT CHANGE UP (ref 27–34)
MCHC RBC-ENTMCNC: 33.5 GM/DL — SIGNIFICANT CHANGE UP (ref 32–36)
MCV RBC AUTO: 91.2 FL — SIGNIFICANT CHANGE UP (ref 80–100)
MONOCYTES # BLD AUTO: 0.4 K/UL — SIGNIFICANT CHANGE UP (ref 0–0.9)
MONOCYTES NFR BLD AUTO: 6 % — SIGNIFICANT CHANGE UP (ref 2–14)
NEUTROPHILS # BLD AUTO: 5.04 K/UL — SIGNIFICANT CHANGE UP (ref 1.8–7.4)
NEUTROPHILS NFR BLD AUTO: 75.5 % — SIGNIFICANT CHANGE UP (ref 43–77)
NRBC # BLD: 0 /100 WBCS — SIGNIFICANT CHANGE UP (ref 0–0)
PLATELET # BLD AUTO: 172 K/UL — SIGNIFICANT CHANGE UP (ref 150–400)
PROTHROM AB SERPL-ACNC: 9 SEC — LOW (ref 9.5–13)
RBC # BLD: 4.19 M/UL — SIGNIFICANT CHANGE UP (ref 3.8–5.2)
RBC # FLD: 13 % — SIGNIFICANT CHANGE UP (ref 10.3–14.5)
WBC # BLD: 6.68 K/UL — SIGNIFICANT CHANGE UP (ref 3.8–10.5)
WBC # FLD AUTO: 6.68 K/UL — SIGNIFICANT CHANGE UP (ref 3.8–10.5)

## 2023-08-24 RX ORDER — SODIUM CHLORIDE 9 MG/ML
1000 INJECTION, SOLUTION INTRAVENOUS
Refills: 0 | Status: DISCONTINUED | OUTPATIENT
Start: 2023-08-24 | End: 2023-08-25

## 2023-08-24 RX ORDER — CHLORHEXIDINE GLUCONATE 213 G/1000ML
1 SOLUTION TOPICAL DAILY
Refills: 0 | Status: DISCONTINUED | OUTPATIENT
Start: 2023-08-24 | End: 2023-08-25

## 2023-08-24 RX ORDER — CITRIC ACID/SODIUM CITRATE 300-500 MG
15 SOLUTION, ORAL ORAL EVERY 6 HOURS
Refills: 0 | Status: DISCONTINUED | OUTPATIENT
Start: 2023-08-24 | End: 2023-08-25

## 2023-08-25 VITALS
SYSTOLIC BLOOD PRESSURE: 112 MMHG | HEART RATE: 103 BPM | OXYGEN SATURATION: 99 % | DIASTOLIC BLOOD PRESSURE: 73 MMHG | RESPIRATION RATE: 19 BRPM | TEMPERATURE: 100 F

## 2023-08-25 LAB — T PALLIDUM AB TITR SER: NEGATIVE — SIGNIFICANT CHANGE UP

## 2023-08-25 DEVICE — BLLN CERVICAL RIPENING 18FR 40CM: Type: IMPLANTABLE DEVICE | Status: FUNCTIONAL

## 2023-08-25 RX ORDER — DIPHENHYDRAMINE HCL 50 MG
25 CAPSULE ORAL EVERY 6 HOURS
Refills: 0 | Status: DISCONTINUED | OUTPATIENT
Start: 2023-08-25 | End: 2023-08-28

## 2023-08-25 RX ORDER — OXYCODONE HYDROCHLORIDE 5 MG/1
5 TABLET ORAL EVERY 4 HOURS
Refills: 0 | Status: DISCONTINUED | OUTPATIENT
Start: 2023-08-25 | End: 2023-08-28

## 2023-08-25 RX ORDER — SODIUM CHLORIDE 9 MG/ML
1000 INJECTION, SOLUTION INTRAVENOUS
Refills: 0 | Status: DISCONTINUED | OUTPATIENT
Start: 2023-08-25 | End: 2023-08-28

## 2023-08-25 RX ORDER — FOLIC ACID 0.8 MG
1 TABLET ORAL DAILY
Refills: 0 | Status: DISCONTINUED | OUTPATIENT
Start: 2023-08-25 | End: 2023-08-28

## 2023-08-25 RX ORDER — AMPICILLIN TRIHYDRATE 250 MG
CAPSULE ORAL
Refills: 0 | Status: DISCONTINUED | OUTPATIENT
Start: 2023-08-25 | End: 2023-08-25

## 2023-08-25 RX ORDER — KETOROLAC TROMETHAMINE 30 MG/ML
30 SYRINGE (ML) INJECTION EVERY 6 HOURS
Refills: 0 | Status: DISCONTINUED | OUTPATIENT
Start: 2023-08-25 | End: 2023-08-26

## 2023-08-25 RX ORDER — AMPICILLIN TRIHYDRATE 250 MG
1 CAPSULE ORAL EVERY 4 HOURS
Refills: 0 | Status: DISCONTINUED | OUTPATIENT
Start: 2023-08-25 | End: 2023-08-25

## 2023-08-25 RX ORDER — HEPARIN SODIUM 5000 [USP'U]/ML
5000 INJECTION INTRAVENOUS; SUBCUTANEOUS EVERY 12 HOURS
Refills: 0 | Status: DISCONTINUED | OUTPATIENT
Start: 2023-08-26 | End: 2023-08-28

## 2023-08-25 RX ORDER — AZITHROMYCIN 500 MG/1
500 TABLET, FILM COATED ORAL ONCE
Refills: 0 | Status: DISCONTINUED | OUTPATIENT
Start: 2023-08-25 | End: 2023-08-25

## 2023-08-25 RX ORDER — MAGNESIUM HYDROXIDE 400 MG/1
30 TABLET, CHEWABLE ORAL
Refills: 0 | Status: DISCONTINUED | OUTPATIENT
Start: 2023-08-25 | End: 2023-08-28

## 2023-08-25 RX ORDER — IBUPROFEN 200 MG
600 TABLET ORAL EVERY 6 HOURS
Refills: 0 | Status: COMPLETED | OUTPATIENT
Start: 2023-08-25 | End: 2024-07-23

## 2023-08-25 RX ORDER — CEFAZOLIN SODIUM 1 G
2000 VIAL (EA) INJECTION ONCE
Refills: 0 | Status: COMPLETED | OUTPATIENT
Start: 2023-08-25 | End: 2023-08-25

## 2023-08-25 RX ORDER — LANOLIN
1 OINTMENT (GRAM) TOPICAL EVERY 6 HOURS
Refills: 0 | Status: DISCONTINUED | OUTPATIENT
Start: 2023-08-25 | End: 2023-08-28

## 2023-08-25 RX ORDER — CITRIC ACID/SODIUM CITRATE 300-500 MG
30 SOLUTION, ORAL ORAL ONCE
Refills: 0 | Status: COMPLETED | OUTPATIENT
Start: 2023-08-25 | End: 2023-08-25

## 2023-08-25 RX ORDER — OXYTOCIN 10 UNIT/ML
VIAL (ML) INJECTION
Qty: 30 | Refills: 0 | Status: DISCONTINUED | OUTPATIENT
Start: 2023-08-25 | End: 2023-08-25

## 2023-08-25 RX ORDER — TETANUS TOXOID, REDUCED DIPHTHERIA TOXOID AND ACELLULAR PERTUSSIS VACCINE, ADSORBED 5; 2.5; 8; 8; 2.5 [IU]/.5ML; [IU]/.5ML; UG/.5ML; UG/.5ML; UG/.5ML
0.5 SUSPENSION INTRAMUSCULAR ONCE
Refills: 0 | Status: DISCONTINUED | OUTPATIENT
Start: 2023-08-25 | End: 2023-08-28

## 2023-08-25 RX ORDER — AMPICILLIN TRIHYDRATE 250 MG
2 CAPSULE ORAL ONCE
Refills: 0 | Status: COMPLETED | OUTPATIENT
Start: 2023-08-25 | End: 2023-08-25

## 2023-08-25 RX ORDER — FERROUS SULFATE 325(65) MG
325 TABLET ORAL DAILY
Refills: 0 | Status: DISCONTINUED | OUTPATIENT
Start: 2023-08-25 | End: 2023-08-28

## 2023-08-25 RX ORDER — FAMOTIDINE 10 MG/ML
20 INJECTION INTRAVENOUS ONCE
Refills: 0 | Status: COMPLETED | OUTPATIENT
Start: 2023-08-25 | End: 2023-08-25

## 2023-08-25 RX ORDER — SIMETHICONE 80 MG/1
80 TABLET, CHEWABLE ORAL EVERY 4 HOURS
Refills: 0 | Status: DISCONTINUED | OUTPATIENT
Start: 2023-08-25 | End: 2023-08-28

## 2023-08-25 RX ORDER — OXYTOCIN 10 UNIT/ML
333.33 VIAL (ML) INJECTION
Qty: 20 | Refills: 0 | Status: DISCONTINUED | OUTPATIENT
Start: 2023-08-25 | End: 2023-08-28

## 2023-08-25 RX ORDER — ACETAMINOPHEN 500 MG
1000 TABLET ORAL ONCE
Refills: 0 | Status: COMPLETED | OUTPATIENT
Start: 2023-08-25 | End: 2023-08-25

## 2023-08-25 RX ORDER — ACETAMINOPHEN 500 MG
975 TABLET ORAL EVERY 6 HOURS
Refills: 0 | Status: DISCONTINUED | OUTPATIENT
Start: 2023-08-25 | End: 2023-08-28

## 2023-08-25 RX ADMIN — Medication 2 MILLIUNIT(S)/MIN: at 11:08

## 2023-08-25 RX ADMIN — Medication 1000 MILLIUNIT(S)/MIN: at 21:11

## 2023-08-25 RX ADMIN — Medication 1000 MILLIGRAM(S): at 23:41

## 2023-08-25 RX ADMIN — Medication 208 GRAM(S): at 14:00

## 2023-08-25 RX ADMIN — Medication 208 GRAM(S): at 18:10

## 2023-08-25 RX ADMIN — Medication 200 GRAM(S): at 10:06

## 2023-08-25 RX ADMIN — Medication 30 MILLILITER(S): at 20:15

## 2023-08-25 RX ADMIN — Medication 400 MILLIGRAM(S): at 23:21

## 2023-08-25 RX ADMIN — FAMOTIDINE 20 MILLIGRAM(S): 10 INJECTION INTRAVENOUS at 20:15

## 2023-08-25 RX ADMIN — SODIUM CHLORIDE 125 MILLILITER(S): 9 INJECTION, SOLUTION INTRAVENOUS at 02:15

## 2023-08-25 RX ADMIN — Medication 100 MILLIGRAM(S): at 20:30

## 2023-08-26 ENCOUNTER — TRANSCRIPTION ENCOUNTER (OUTPATIENT)
Age: 38
End: 2023-08-26

## 2023-08-26 DIAGNOSIS — Z98.891 HISTORY OF UTERINE SCAR FROM PREVIOUS SURGERY: ICD-10-CM

## 2023-08-26 LAB
BASOPHILS # BLD AUTO: 0.03 K/UL — SIGNIFICANT CHANGE UP (ref 0–0.2)
BASOPHILS NFR BLD AUTO: 0.2 % — SIGNIFICANT CHANGE UP (ref 0–2)
EOSINOPHIL # BLD AUTO: 0.03 K/UL — SIGNIFICANT CHANGE UP (ref 0–0.5)
EOSINOPHIL NFR BLD AUTO: 0.2 % — SIGNIFICANT CHANGE UP (ref 0–6)
HCT VFR BLD CALC: 33.4 % — LOW (ref 34.5–45)
HGB BLD-MCNC: 11.3 G/DL — LOW (ref 11.5–15.5)
IMM GRANULOCYTES NFR BLD AUTO: 0.4 % — SIGNIFICANT CHANGE UP (ref 0–0.9)
LYMPHOCYTES # BLD AUTO: 0.98 K/UL — LOW (ref 1–3.3)
LYMPHOCYTES # BLD AUTO: 7.9 % — LOW (ref 13–44)
MCHC RBC-ENTMCNC: 31 PG — SIGNIFICANT CHANGE UP (ref 27–34)
MCHC RBC-ENTMCNC: 33.8 GM/DL — SIGNIFICANT CHANGE UP (ref 32–36)
MCV RBC AUTO: 91.5 FL — SIGNIFICANT CHANGE UP (ref 80–100)
MONOCYTES # BLD AUTO: 0.61 K/UL — SIGNIFICANT CHANGE UP (ref 0–0.9)
MONOCYTES NFR BLD AUTO: 4.9 % — SIGNIFICANT CHANGE UP (ref 2–14)
NEUTROPHILS # BLD AUTO: 10.74 K/UL — HIGH (ref 1.8–7.4)
NEUTROPHILS NFR BLD AUTO: 86.4 % — HIGH (ref 43–77)
NRBC # BLD: 0 /100 WBCS — SIGNIFICANT CHANGE UP (ref 0–0)
PLATELET # BLD AUTO: 189 K/UL — SIGNIFICANT CHANGE UP (ref 150–400)
RBC # BLD: 3.65 M/UL — LOW (ref 3.8–5.2)
RBC # FLD: 13.2 % — SIGNIFICANT CHANGE UP (ref 10.3–14.5)
WBC # BLD: 12.44 K/UL — HIGH (ref 3.8–10.5)
WBC # FLD AUTO: 12.44 K/UL — HIGH (ref 3.8–10.5)

## 2023-08-26 RX ORDER — SENNOSIDES/DOCUSATE SODIUM 8.6MG-50MG
2 TABLET ORAL
Qty: 28 | Refills: 0
Start: 2023-08-26 | End: 2023-09-08

## 2023-08-26 RX ORDER — SIMETHICONE 80 MG/1
1 TABLET, CHEWABLE ORAL
Qty: 16 | Refills: 0
Start: 2023-08-26 | End: 2023-08-29

## 2023-08-26 RX ORDER — IBUPROFEN 200 MG
600 TABLET ORAL EVERY 6 HOURS
Refills: 0 | Status: DISCONTINUED | OUTPATIENT
Start: 2023-08-26 | End: 2023-08-28

## 2023-08-26 RX ORDER — IBUPROFEN 200 MG
1 TABLET ORAL
Qty: 28 | Refills: 0
Start: 2023-08-26 | End: 2023-09-01

## 2023-08-26 RX ORDER — ACETAMINOPHEN 500 MG
3 TABLET ORAL
Qty: 84 | Refills: 0
Start: 2023-08-26 | End: 2023-09-01

## 2023-08-26 RX ORDER — FERROUS SULFATE 325(65) MG
1 TABLET ORAL
Qty: 30 | Refills: 1
Start: 2023-08-26 | End: 2023-10-24

## 2023-08-26 RX ORDER — ASCORBIC ACID 60 MG
1 TABLET,CHEWABLE ORAL
Qty: 30 | Refills: 0
Start: 2023-08-26 | End: 2023-09-24

## 2023-08-26 RX ADMIN — Medication 30 MILLIGRAM(S): at 07:00

## 2023-08-26 RX ADMIN — Medication 325 MILLIGRAM(S): at 11:55

## 2023-08-26 RX ADMIN — Medication 30 MILLIGRAM(S): at 17:43

## 2023-08-26 RX ADMIN — Medication 30 MILLIGRAM(S): at 06:33

## 2023-08-26 RX ADMIN — HEPARIN SODIUM 5000 UNIT(S): 5000 INJECTION INTRAVENOUS; SUBCUTANEOUS at 22:09

## 2023-08-26 RX ADMIN — Medication 975 MILLIGRAM(S): at 23:00

## 2023-08-26 RX ADMIN — Medication 30 MILLIGRAM(S): at 11:55

## 2023-08-26 RX ADMIN — Medication 600 MILLIGRAM(S): at 23:00

## 2023-08-26 RX ADMIN — SIMETHICONE 80 MILLIGRAM(S): 80 TABLET, CHEWABLE ORAL at 11:55

## 2023-08-26 RX ADMIN — Medication 30 MILLIGRAM(S): at 12:33

## 2023-08-26 RX ADMIN — Medication 1 MILLIGRAM(S): at 11:55

## 2023-08-26 RX ADMIN — Medication 1 TABLET(S): at 11:55

## 2023-08-26 RX ADMIN — Medication 975 MILLIGRAM(S): at 22:00

## 2023-08-26 RX ADMIN — SIMETHICONE 80 MILLIGRAM(S): 80 TABLET, CHEWABLE ORAL at 22:09

## 2023-08-26 RX ADMIN — Medication 30 MILLIGRAM(S): at 17:24

## 2023-08-26 RX ADMIN — Medication 975 MILLIGRAM(S): at 08:44

## 2023-08-26 RX ADMIN — Medication 975 MILLIGRAM(S): at 14:50

## 2023-08-26 RX ADMIN — HEPARIN SODIUM 5000 UNIT(S): 5000 INJECTION INTRAVENOUS; SUBCUTANEOUS at 08:43

## 2023-08-26 RX ADMIN — Medication 975 MILLIGRAM(S): at 14:01

## 2023-08-26 RX ADMIN — Medication 600 MILLIGRAM(S): at 22:00

## 2023-08-26 RX ADMIN — Medication 975 MILLIGRAM(S): at 09:30

## 2023-08-26 NOTE — PROGRESS NOTE ADULT - PROBLEM SELECTOR PLAN 1
A/P: 36yo admitted for miol for PROM POD #1 s/p STAT primary c/s @ 39.4wks for category III tracing, AMA, IVF pregnancy, pt stable  -Pain management as needed  -DVT ppx: OOB and ambulate, heparin   -f/u Rpt CBC   - f/u trial of void  -Advance diet to regular  -Encourage breastfeeding   -pt seen and evaluated with dr. jackson  -d/w dr. barrientos A/P: 38yo admitted for miol for PROM POD #1 s/p STAT primary c/s @ 39.4wks for category III tracing, AMA, IVF pregnancy, hepB reactive being followed by hepatology outpatient, pt stable  -Pain management as needed  -DVT ppx: OOB and ambulate, heparin   -f/u Rpt CBC   - f/u trial of void  -Advance diet to regular  -Encourage breastfeeding   -follow up with hepatologist outpatient   -pt seen and evaluated with dr. jackson  -d/w dr. barrientos

## 2023-08-26 NOTE — DISCHARGE NOTE OB - CARE PROVIDER_API CALL
Ramiro Ellis  Obstetrics and Gynecology  [address], [address]  [place] [address]  Phone: [phone]  Fax: [phone]  Follow Up Time: 2 weeks

## 2023-08-26 NOTE — DISCHARGE NOTE OB - HOSPITAL COURSE
36yo admitted for miol for PROM s/p STAT primary c/s @39.4wks for category III tracing, uncomplicated delivery and postartpum care, pt stable

## 2023-08-26 NOTE — CHART NOTE - NSCHARTNOTEFT_GEN_A_CORE
Patient seen at bedside, resting comfortably offers no new complaints.  Denies HA, CP, SOB, N/V/D, dizziness, palpitations, worsening abdominal pain, worsening vaginal bleeding, or any other concerns.      Vital Signs Last 24 Hrs  T(C): 36.7 (26 Aug 2023 00:59), Max: 37.5 (25 Aug 2023 21:40)  T(F): 98.1 (26 Aug 2023 00:59), Max: 99.5 (25 Aug 2023 21:40)  HR: 72 (26 Aug 2023 00:59) (72 - 103)  BP: 107/57 (26 Aug 2023 00:59) (105/53 - 131/58)  BP(mean): 68 (25 Aug 2023 23:40) (68 - 83)  RR: 18 (26 Aug 2023 00:59) (18 - 21)  SpO2: 95% (26 Aug 2023 00:59) (95% - 99%)    Parameters below as of 26 Aug 2023 00:59  Patient On (Oxygen Delivery Method): room air    Gen: A&O x 3, NAD  Chest: CTA B/L  Cardiac: S1, S2  RRR  Breast: Soft, nontender, nonengorged  Abdomen: +BS; soft; NT; ND; Dressing C/D/I  Gyn: Minimal lochia  Ext: Nontender, DTRS 2+, no worsening edema, venodynes intact                          12.8   6.68  )-----------( 172      ( 24 Aug 2023 11:44 )             38.2       A/P: POD #1 s/p primary c/s. Cat 2       -Pain management as needed  -Advance as per protocol  -f/u CBC   -DC paredes f/u void 12hrs postop  -Advance diet with flatus  -Encourage breastfeeding and incentive spirometer use  -DVT prophylaxis   -d/w Dr Gibson, attending on call

## 2023-08-26 NOTE — DISCHARGE NOTE OB - PATIENT PORTAL LINK FT
You can access the FollowMyHealth Patient Portal offered by [facility] by registering at the following website: [email or web address]. By joining [facility]’s FollowMyHealth portal, you will also be able to view your health information using other applications (apps) compatible with our system.

## 2023-08-26 NOTE — DISCHARGE NOTE OB - PHYSICIAN SECTION COMPLETE
Physical Therapy  Facility/Department: [facility] 5T ORTHO/NEURO  Daily Treatment Note/Discharge Summary   NAME: Hernán Crabtree  : 1971  MRN: [id number]    Date of Service: 2020    Discharge Recommendations:    Hernán Crabtree scored a 19/24 on the AM-PAC short mobility form. Current research shows that an AM-PAC score of 18 or greater is typically associated with a discharge to the patient's home setting. Based on the patient's AM-PAC score and their current functional mobility deficits, it is recommended that the patient have 2-3 sessions per week of Physical Therapy at d/c to increase the patient's independence. At this time, this patient demonstrates the endurance and safety to discharge home with A.     PT Equipment Recommendations  Equipment Needed: Yes  Mobility Devices: Marlon Denton: Rolling    Assessment   Assessment: Pt with improved functional mobility and endurance this session. Pt SBA/supervison for transfers, amb with RW and stair negotiation with HR. Pt planning to d/c home with A from sister. Pt reporting no safety concerns about d/c plan. Pt with no further acute PT needs at this time will sign off from PT services. Patient Education: Role of PT. Pt verbalized partial understanding and would benefit from ongoing education. Barriers to Learning: none  REQUIRES PT FOLLOW UP: No  Activity Tolerance  Activity Tolerance: Patient Tolerated treatment well     Patient Diagnosis(es): There were no encounter diagnoses. has a past medical history of Pulmonary embolism ([address] has a past surgical history that includes Cholecystectomy and craniotomy (Left, 2020). Restrictions  Position Activity Restriction  Other position/activity restrictions: Up with assist  Subjective   General  Chart Reviewed: Yes  Additional Pertinent Hx: Pt to [facility] ED  with stroke like symptoms. Head CT: (+) Left frontal lobe ICH. CTA head/neck: no stenosis, cerebral aneurysm or AVM.   Transferred to ICU at [facility] for Neurosurgery. 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 PMH:  None per chart  Family / Caregiver Present: No  General Comment  Comments: Pt found sitting up in chair upon arrival, denying pain and agreeable to therapy. Orientation  Orientation  Overall Orientation Status: Within Functional Limits  Objective      Transfers  Sit to Stand: Supervision  Stand to sit: Supervision  Ambulation  Ambulation?: Yes  Ambulation 1  Surface: level tile  Device: Rolling Walker  Assistance: Stand by assistance  Quality of Gait: mod-quick beba, mod stride length and mod Malgorzata. Overall steady with no LOB or near LOB.   Distance: 800'  Comments: patient with flat affect, cues for safe use of walker  Stairs/Curb  Stairs?: Yes  Stairs  # Steps : 24(2x12)  Stairs Height: 6\"  Rails: Right ascending  Device: No Device  Assistance: Stand by assistance  Comment: safe and steady           AM-PAC Score  AM-PAC Inpatient Mobility Raw Score : 19 (09/24/20 0925)  AM-PAC Inpatient T-Scale Score : 45.44 (09/24/20 0925)  Mobility Inpatient CMS 0-100% Score: 41.77 (09/24/20 0925)  Mobility Inpatient CMS G-Code Modifier : CK (09/24/20 0925)          Goals  Short term goals  Time Frame for Short term goals: Discharge  Short term goal 1: supine <> sit independent - ongoing  Short term goal 2: sit <> stand modified independent - ongoing  Short term goal 3: ambulate 150ft with/without assistive device supervision - Met 9/24  Short term goal 4: ambulate up/down flight of steps with rails CGA - Met 9/24  Patient Goals   Patient goals : Return home    Plan    Plan  Times per week: d/c acute PT  Current Treatment Recommendations: Transfer Training, Gait Training, Functional Mobility Training, Strengthening  Safety Devices  Type of devices: Gait belt, Nurse notified, Call light within reach, Left in chair, Chair alarm in place     Therapy Time   Individual Concurrent Group Co-treatment   Time In [address] [facility] [address]         Time Out 4388 Minutes 23           Timed Code Treatment Minutes:  23    Total Treatment Minutes:  23    Tessa Marroquin, PT, DPT Yes

## 2023-08-26 NOTE — DISCHARGE NOTE OB - CARE PLAN
Principal Discharge DX:	Status post primary low transverse  section  Assessment and plan of treatment:	Continue breastfeeding.  Motrin as needed for pain.  Ambulate daily.  No heavy lifting or anything per vagina x 6 weeks - no sex, tampons, douching, tub baths, etc.  Follow up in office in 1-2 weeks for incision check, and then at 6 weeks for postpartum check.   1

## 2023-08-26 NOTE — PROGRESS NOTE ADULT - ASSESSMENT
A/P: 36yo admitted for miol for PROM POD #1 s/p STAT primary c/s @ 39.4wks for category III tracing, AMA, IVF pregnancy, pt stable  -Pain management as needed  -DVT ppx: OOB and ambulate, heparin   -f/u Rpt CBC   - f/u trial of void  -Advance diet to regular  -Encourage breastfeeding   -pt seen and evaluated with dr. jackson  -d/w dr. barrientos  A/P: 36yo admitted for miol for PROM POD #1 s/p STAT primary c/s @ 39.4wks for category III tracing, AMA, IVF pregnancy, hepB reactive being followed by hepatology outpatient, pt stable  -Pain management as needed  -DVT ppx: OOB and ambulate, heparin   -f/u Rpt CBC   - f/u trial of void  -Advance diet to regular  -Encourage breastfeeding   -follow up with hepatologist outpatient   -pt seen and evaluated with dr. jackson  -d/w dr. barrientos

## 2023-08-26 NOTE — DISCHARGE NOTE OB - FINDINGS/TREATMENT
fetus Mastoid Interpolation Flap Division And Inset Text: Division and inset of the mastoid interpolation flap was performed to achieve optimal aesthetic result, restore normal anatomic appearance and avoid distortion of normal anatomy, expedite and facilitate wound healing, achieve optimal functional result and because linear closure either not possible or would produce suboptimal result. The patient was prepped and draped in the usual manner. The pedicle was infiltrated with local anesthesia. The pedicle was sectioned with a #15 blade. The pedicle was de-bulked and trimmed to match the shape of the defect. Hemostasis was achieved. The flap donor site and free margin of the flap were secured with deep buried sutures and the wound edges were re-approximated.

## 2023-08-26 NOTE — DISCHARGE NOTE OB - MEDICATION SUMMARY - MEDICATIONS TO TAKE
I will START or STAY ON the medications listed below when I get home from the hospital:    ibuprofen 600 mg oral tablet  -- 1 tab(s) by mouth every 6 hours as needed for  moderate pain  -- Indication: For moderate pain    acetaminophen 325 mg oral capsule  -- 3 cap(s) by mouth every 6 hours as needed for  mild pain  -- Indication: For mild pain    Prenatal Multivitamins with Folic Acid 0.4 mg oral capsule  -- 1 tab(s) by mouth once a day  -- Indication: For breast feeding    ferrous sulfate 325 mg (65 mg elemental iron) oral tablet  -- 1 tab(s) by mouth once a day  -- Indication: For anemia    Senna Plus 50 mg-8.6 mg oral tablet  -- 2 tab(s) by mouth once a day (at bedtime) as needed for  constipation  -- Indication: For constipation    simethicone 80 mg oral tablet, chewable  -- 1 tab(s) chewed every 6 hours as needed for  indigestion as needed for gas pain  -- Indication: For gas pain    ascorbic acid 500 mg oral capsule  -- 1 cap(s) by mouth once a day  -- Indication: For anemia

## 2023-08-26 NOTE — DISCHARGE NOTE OB - MOOD SWINGS / DEPRESSION OR CRYING SPELLS LASTING MORE THAN 3 DAYS
Reason for Admission:   Patient came to ed for complaint of sob with lower extremity edema. RUR Score:     9%                  Plan for utilizing home health:  She has not used home health services or inpatient rehab in the past.        PCP: First and Last name:  Sukumar Colvin NP     Name of Practice:  [facility]. Are you a current patient: Yes/No:  Yes     Approximate date of last visit: 12/2019     Can you participate in a virtual visit with your PCP:  No                      Current Advanced Directive/Advance Care Plan:  Not on file. Her daughter states she does not have one and they would like to discuss before filling out a form. Transition of Care Plan:  Patient lives in one story home with her daughter. Her daughter does not work. The patient is able to feed herself. The daughter does the cooking and assists her mother with adl;s. She does not use home oxygen or cpap. Per daughter the patient has bad knees and does not walk. She goes from her bed to the bedside commode which is next to her bed only. Her daughter cooks and brings the food to her. Per daughter and patient are willing to have home health/rehab if needed. Nursing has paged md to ask for pt/ot consults. Care Management Interventions  PCP Verified by CM: Yes  Transition of Care Consult (CM Consult): Discharge Planning  Discharge Durable Medical Equipment: (Patient has bedside commode. She has walker , wheelchair and cane at home. )  Physical Therapy Consult: Yes  Occupational Therapy Consult: Yes  Current Support Network: Other(Her daughter lives with her. )  Confirm Follow Up Transport: Family  Discharge Location  Discharge Placement: Home        Amira WHITEN [employer]        [phone] Statement Selected

## 2023-08-27 LAB
BASOPHILS # BLD AUTO: 0.02 K/UL — SIGNIFICANT CHANGE UP (ref 0–0.2)
BASOPHILS NFR BLD AUTO: 0.2 % — SIGNIFICANT CHANGE UP (ref 0–2)
EOSINOPHIL # BLD AUTO: 0.08 K/UL — SIGNIFICANT CHANGE UP (ref 0–0.5)
EOSINOPHIL NFR BLD AUTO: 0.9 % — SIGNIFICANT CHANGE UP (ref 0–6)
HCT VFR BLD CALC: 30.8 % — LOW (ref 34.5–45)
HGB BLD-MCNC: 10.3 G/DL — LOW (ref 11.5–15.5)
IMM GRANULOCYTES NFR BLD AUTO: 0.5 % — SIGNIFICANT CHANGE UP (ref 0–0.9)
LYMPHOCYTES # BLD AUTO: 0.98 K/UL — LOW (ref 1–3.3)
LYMPHOCYTES # BLD AUTO: 10.5 % — LOW (ref 13–44)
MCHC RBC-ENTMCNC: 30.6 PG — SIGNIFICANT CHANGE UP (ref 27–34)
MCHC RBC-ENTMCNC: 33.4 GM/DL — SIGNIFICANT CHANGE UP (ref 32–36)
MCV RBC AUTO: 91.4 FL — SIGNIFICANT CHANGE UP (ref 80–100)
MONOCYTES # BLD AUTO: 0.45 K/UL — SIGNIFICANT CHANGE UP (ref 0–0.9)
MONOCYTES NFR BLD AUTO: 4.8 % — SIGNIFICANT CHANGE UP (ref 2–14)
NEUTROPHILS # BLD AUTO: 7.71 K/UL — HIGH (ref 1.8–7.4)
NEUTROPHILS NFR BLD AUTO: 83.1 % — HIGH (ref 43–77)
NRBC # BLD: 0 /100 WBCS — SIGNIFICANT CHANGE UP (ref 0–0)
PLATELET # BLD AUTO: 184 K/UL — SIGNIFICANT CHANGE UP (ref 150–400)
RBC # BLD: 3.37 M/UL — LOW (ref 3.8–5.2)
RBC # FLD: 13.5 % — SIGNIFICANT CHANGE UP (ref 10.3–14.5)
WBC # BLD: 9.29 K/UL — SIGNIFICANT CHANGE UP (ref 3.8–10.5)
WBC # FLD AUTO: 9.29 K/UL — SIGNIFICANT CHANGE UP (ref 3.8–10.5)

## 2023-08-27 RX ORDER — SENNA PLUS 8.6 MG/1
1 TABLET ORAL
Refills: 0 | Status: DISCONTINUED | OUTPATIENT
Start: 2023-08-27 | End: 2023-08-28

## 2023-08-27 RX ADMIN — Medication 600 MILLIGRAM(S): at 05:20

## 2023-08-27 RX ADMIN — Medication 1 MILLIGRAM(S): at 12:34

## 2023-08-27 RX ADMIN — Medication 600 MILLIGRAM(S): at 06:20

## 2023-08-27 RX ADMIN — Medication 975 MILLIGRAM(S): at 05:20

## 2023-08-27 RX ADMIN — Medication 975 MILLIGRAM(S): at 22:49

## 2023-08-27 RX ADMIN — Medication 600 MILLIGRAM(S): at 12:35

## 2023-08-27 RX ADMIN — HEPARIN SODIUM 5000 UNIT(S): 5000 INJECTION INTRAVENOUS; SUBCUTANEOUS at 21:51

## 2023-08-27 RX ADMIN — Medication 975 MILLIGRAM(S): at 06:20

## 2023-08-27 RX ADMIN — Medication 975 MILLIGRAM(S): at 16:04

## 2023-08-27 RX ADMIN — Medication 600 MILLIGRAM(S): at 13:35

## 2023-08-27 RX ADMIN — Medication 1 TABLET(S): at 12:34

## 2023-08-27 RX ADMIN — Medication 975 MILLIGRAM(S): at 16:56

## 2023-08-27 RX ADMIN — Medication 975 MILLIGRAM(S): at 21:49

## 2023-08-27 RX ADMIN — Medication 325 MILLIGRAM(S): at 12:34

## 2023-08-27 RX ADMIN — MAGNESIUM HYDROXIDE 30 MILLILITER(S): 400 TABLET, CHEWABLE ORAL at 08:45

## 2023-08-27 RX ADMIN — HEPARIN SODIUM 5000 UNIT(S): 5000 INJECTION INTRAVENOUS; SUBCUTANEOUS at 09:48

## 2023-08-27 RX ADMIN — SIMETHICONE 80 MILLIGRAM(S): 80 TABLET, CHEWABLE ORAL at 08:45

## 2023-08-27 NOTE — PROGRESS NOTE ADULT - PROBLEM SELECTOR PLAN 1
-Pain management as needed  -cont post op care  -OOB and ambulate  -encourage insentive spirometer use  -Encourage breastfeeding   -d/w dr Gotti

## 2023-08-27 NOTE — PROGRESS NOTE ADULT - SUBJECTIVE AND OBJECTIVE BOX
Patient seen at bedside resting comfortably offers no new complaints. + Ambulation, + void without difficulty, + flatus;  no bm; tolerating regular diet. both breastfeeding and bottle feeding. Denies HA, blurry vision or epigastric pain, CP, SOB, N/V/D,  dizziness, palpitations, worsening vaginal bleeding.    Vital Signs Last 24 Hrs  T(C): 36.7 (27 Aug 2023 06:00), Max: 37.2 (26 Aug 2023 09:13)  T(F): 98 (27 Aug 2023 06:00), Max: 99 (26 Aug 2023 22:44)  HR: 69 (27 Aug 2023 06:00) (69 - 78)  BP: 106/67 (27 Aug 2023 06:00) (93/58 - 110/70)  BP(mean): --  RR: 18 (27 Aug 2023 06:00) (18 - 18)  SpO2: 99% (27 Aug 2023 06:00) (97% - 99%)    Parameters below as of 27 Aug 2023 06:00  Patient On (Oxygen Delivery Method): room air    Gen: A&O x 3, NAD  Chest: CTABL  Cardiac: S1, S2, RRR  Breast: Soft, nontender, nonengorged  Abdomen: +BS; soft; Nontender, nondistended, Incision C/D/I steri strips in place   Gyn: Minimal lochia  Extremities: Nontender, DTRS 2+, no worsening edema                          11.3   12.44 )-----------( 189      ( 26 Aug 2023 06:00 )             33.4

## 2023-08-28 ENCOUNTER — NON-APPOINTMENT (OUTPATIENT)
Age: 38
End: 2023-08-28

## 2023-08-28 ENCOUNTER — APPOINTMENT (OUTPATIENT)
Dept: OBGYN | Facility: CLINIC | Age: 38
End: 2023-08-28

## 2023-08-28 VITALS
DIASTOLIC BLOOD PRESSURE: 75 MMHG | TEMPERATURE: 98 F | HEART RATE: 62 BPM | SYSTOLIC BLOOD PRESSURE: 125 MMHG | OXYGEN SATURATION: 96 % | RESPIRATION RATE: 18 BRPM

## 2023-08-28 PROCEDURE — G0463: CPT

## 2023-08-28 PROCEDURE — 59025 FETAL NON-STRESS TEST: CPT

## 2023-08-28 PROCEDURE — 59050 FETAL MONITOR W/REPORT: CPT

## 2023-08-28 PROCEDURE — 59510 CESAREAN DELIVERY: CPT

## 2023-08-28 PROCEDURE — C1726: CPT

## 2023-08-28 PROCEDURE — 85025 COMPLETE CBC W/AUTO DIFF WBC: CPT

## 2023-08-28 PROCEDURE — 86901 BLOOD TYPING SEROLOGIC RH(D): CPT

## 2023-08-28 PROCEDURE — 85384 FIBRINOGEN ACTIVITY: CPT

## 2023-08-28 PROCEDURE — 36415 COLL VENOUS BLD VENIPUNCTURE: CPT

## 2023-08-28 PROCEDURE — 85610 PROTHROMBIN TIME: CPT

## 2023-08-28 PROCEDURE — 86850 RBC ANTIBODY SCREEN: CPT

## 2023-08-28 PROCEDURE — 86923 COMPATIBILITY TEST ELECTRIC: CPT

## 2023-08-28 PROCEDURE — 86780 TREPONEMA PALLIDUM: CPT

## 2023-08-28 PROCEDURE — 85730 THROMBOPLASTIN TIME PARTIAL: CPT

## 2023-08-28 PROCEDURE — 86900 BLOOD TYPING SEROLOGIC ABO: CPT

## 2023-08-28 RX ADMIN — Medication 975 MILLIGRAM(S): at 10:00

## 2023-08-28 RX ADMIN — Medication 600 MILLIGRAM(S): at 01:02

## 2023-08-28 RX ADMIN — Medication 600 MILLIGRAM(S): at 06:50

## 2023-08-28 RX ADMIN — Medication 975 MILLIGRAM(S): at 09:17

## 2023-08-28 RX ADMIN — SENNA PLUS 1 TABLET(S): 8.6 TABLET ORAL at 06:50

## 2023-08-28 RX ADMIN — MAGNESIUM HYDROXIDE 30 MILLILITER(S): 400 TABLET, CHEWABLE ORAL at 09:13

## 2023-08-28 RX ADMIN — Medication 1 MILLIGRAM(S): at 11:40

## 2023-08-28 RX ADMIN — SIMETHICONE 80 MILLIGRAM(S): 80 TABLET, CHEWABLE ORAL at 11:41

## 2023-08-28 RX ADMIN — Medication 600 MILLIGRAM(S): at 07:27

## 2023-08-28 RX ADMIN — Medication 325 MILLIGRAM(S): at 11:40

## 2023-08-28 RX ADMIN — SIMETHICONE 80 MILLIGRAM(S): 80 TABLET, CHEWABLE ORAL at 06:59

## 2023-08-28 RX ADMIN — Medication 1 TABLET(S): at 11:40

## 2023-08-28 RX ADMIN — Medication 600 MILLIGRAM(S): at 00:02

## 2023-08-28 NOTE — PROGRESS NOTE ADULT - PROBLEM SELECTOR PLAN 1
A/P: POD #3 s/p c/s     -d/c home   -instructions verbalized  -follow up in 1-2weeks in office  -d/w dr. vira adler

## 2023-08-28 NOTE — PROGRESS NOTE ADULT - ASSESSMENT
A/P: POD #3 s/p c/s     -d/c home   -instructions verbalized  -follow up in 1-2weeks in officen  -d/w dr. vira adler

## 2023-08-28 NOTE — LACTATION INITIAL EVALUATION - LACTATION INTERVENTIONS
pt. has been breast and formula feeding as baby had decrease in output yesterday per pt.; baby reported to be voiding and passing stool adequately this morning; taught hand expression to provide her EHM as Supplement as needed; assisted with positioning and latch techniques to achieve deep latch and prevent sore nipples and improve milk transfer; Breastfeeding on cue 8-12X/24 hours with diaper count to assess for adequate intake, safe skin to skin and rooming-in encouraged./initiate/review safe skin-to-skin/initiate/review hand expression/initiate/review techniques for position and latch/review techniques to increase milk supply/review techniques to manage sore nipples/engorgement/reviewed components of an effective feeding and at least 8 effective feedings per day required/reviewed importance of monitoring infant diapers, the breastfeeding log, and minimum output each day/reviewed benefits and recommendations for rooming in/reviewed feeding on demand/by cue at least 8 times a day/reviewed indications of inadequate milk transfer that would require supplementation
pt. scheduled for d/c home today with baby; attended mother-baby breastfeeding and d/c class this morning with baby and FOB; pt's questions regarding basic self post-c/s care and basic  care and feedings addressed; pt. provided with inst. for safe formula feeding/prep inst. as pt. is Supplementing with formula; Referred to [facility] Tele-lactation program for breastfeeding assessment f/u and support post-discharge ; pt. verbalized understanding of education provided/initiate/review safe skin-to-skin/initiate/review hand expression/initiate/review techniques for position and latch/post discharge community resources provided/review techniques to increase milk supply/review techniques to manage sore nipples/engorgement/reviewed components of an effective feeding and at least 8 effective feedings per day required/reviewed importance of monitoring infant diapers, the breastfeeding log, and minimum output each day/reviewed benefits and recommendations for rooming in/reviewed feeding on demand/by cue at least 8 times a day/reviewed indications of inadequate milk transfer that would require supplementation

## 2023-08-28 NOTE — PROGRESS NOTE ADULT - SUBJECTIVE AND OBJECTIVE BOX
Patient seen at bedisde resting comfortably offers no new complaints. + Ambulation, + void without difficulty, + flatus; +bm;  tolerating regular diet. both breastfeeding and bottle feeding. Denies HA, CP, SOB, N/V/D,  dizziness, palpitations, worsening abdominal pain, worsening vaginal bleeding, or any other concerns.     Vital Signs Last 24 Hrs  T(C): 36.8 (28 Aug 2023 06:19), Max: 36.8 (28 Aug 2023 06:19)  T(F): 98.2 (28 Aug 2023 06:19), Max: 98.2 (28 Aug 2023 06:19)  HR: 62 (28 Aug 2023 06:19) (62 - 81)  BP: 125/75 (28 Aug 2023 06:19) (112/73 - 125/75)  BP(mean): --  RR: 18 (28 Aug 2023 06:19) (18 - 18)  SpO2: 96% (28 Aug 2023 06:19) (96% - 98%)    Parameters below as of 28 Aug 2023 06:19  Patient On (Oxygen Delivery Method): room air        Gen: A&O x 3, NAD  Chest: CTA B/L  Cardiac: S1,S2  RRR  Breast: Soft, nontender, nonengorged  Abdomen: +BS; soft; Nontender, nondistended, dressing removed Incision C/D/I steri strips in place   Gyn: Minimal lochia  Extremities: Nontender, DTRS 2+, no worsening edema                          10.3   9.29  )-----------( 184      ( 27 Aug 2023 12:38 )             30.8       A/P: POD #3 s/p c/s     -d/c home   -instructions verbalized  -follow up in 1-2weeks in [place]  -d/w dr. vira adler

## 2023-08-28 NOTE — LACTATION INITIAL EVALUATION - AS DELIV COMPLICATIONS OB
IVF pregnancy; fetal bradycardia; see CPN/premature rupture of membranes prior to labor
IVF pregnancy; fetal bradycardia; see CPN/premature rupture of membranes prior to labor

## 2023-08-28 NOTE — PROGRESS NOTE ADULT - PROBLEM SELECTOR PROBLEM 1
Status post primary low transverse  section

## 2023-08-28 NOTE — LACTATION INITIAL EVALUATION - NS LACT CON REASON FOR REQ
c/o sore, painful nipples/primaparous mom
general questions without assessment/follow up consultation

## 2023-09-12 ENCOUNTER — APPOINTMENT (OUTPATIENT)
Dept: OBGYN | Facility: CLINIC | Age: 38
End: 2023-09-12
Payer: COMMERCIAL

## 2023-09-12 VITALS
WEIGHT: 137 LBS | HEIGHT: 62 IN | TEMPERATURE: 98.2 F | BODY MASS INDEX: 25.21 KG/M2 | OXYGEN SATURATION: 96 % | DIASTOLIC BLOOD PRESSURE: 84 MMHG | RESPIRATION RATE: 18 BRPM | HEART RATE: 70 BPM | SYSTOLIC BLOOD PRESSURE: 118 MMHG

## 2023-09-12 DIAGNOSIS — Z98.891 ENCOUNTER FOR ROUTINE POSTPARTUM FOLLOW-UP: ICD-10-CM

## 2023-09-12 DIAGNOSIS — N93.9 ABNORMAL UTERINE AND VAGINAL BLEEDING, UNSPECIFIED: ICD-10-CM

## 2023-09-12 DIAGNOSIS — O09.519 SUPERVISION OF ELDERLY PRIMIGRAVIDA, UNSPECIFIED TRIMESTER: ICD-10-CM

## 2023-09-12 DIAGNOSIS — O09.819 SUPERVISION OF PREGNANCY RESULTING FROM ASSISTED REPRODUCTIVE TECHNOLOGY, UNSPECIFIED TRIMESTER: ICD-10-CM

## 2023-09-12 DIAGNOSIS — Z34.93 ENCOUNTER FOR SUPERVISION OF NORMAL PREGNANCY, UNSPECIFIED, THIRD TRIMESTER: ICD-10-CM

## 2023-09-12 PROCEDURE — 0503F POSTPARTUM CARE VISIT: CPT

## 2023-10-11 ENCOUNTER — APPOINTMENT (OUTPATIENT)
Dept: OBGYN | Facility: CLINIC | Age: 38
End: 2023-10-11
Payer: COMMERCIAL

## 2023-10-11 VITALS
RESPIRATION RATE: 16 BRPM | OXYGEN SATURATION: 99 % | DIASTOLIC BLOOD PRESSURE: 84 MMHG | WEIGHT: 137 LBS | BODY MASS INDEX: 25.21 KG/M2 | HEIGHT: 62 IN | HEART RATE: 66 BPM | TEMPERATURE: 97.2 F | SYSTOLIC BLOOD PRESSURE: 126 MMHG

## 2023-10-11 PROCEDURE — 0503F POSTPARTUM CARE VISIT: CPT

## 2024-01-12 ENCOUNTER — OUTPATIENT (OUTPATIENT)
Dept: OUTPATIENT SERVICES | Facility: HOSPITAL | Age: 39
LOS: 1 days | End: 2024-01-12
Payer: COMMERCIAL

## 2024-01-12 ENCOUNTER — APPOINTMENT (OUTPATIENT)
Dept: ULTRASOUND IMAGING | Facility: CLINIC | Age: 39
End: 2024-01-12
Payer: COMMERCIAL

## 2024-01-12 DIAGNOSIS — B18.1 CHRONIC VIRAL HEPATITIS B WITHOUT DELTA-AGENT: ICD-10-CM

## 2024-01-12 PROCEDURE — 76700 US EXAM ABDOM COMPLETE: CPT

## 2024-01-12 PROCEDURE — 76700 US EXAM ABDOM COMPLETE: CPT | Mod: 26

## 2024-01-23 ENCOUNTER — APPOINTMENT (OUTPATIENT)
Dept: OBGYN | Facility: CLINIC | Age: 39
End: 2024-01-23
Payer: COMMERCIAL

## 2024-01-23 VITALS
DIASTOLIC BLOOD PRESSURE: 74 MMHG | SYSTOLIC BLOOD PRESSURE: 124 MMHG | HEIGHT: 62 IN | HEART RATE: 55 BPM | BODY MASS INDEX: 24.11 KG/M2 | TEMPERATURE: 97.5 F | OXYGEN SATURATION: 96 % | WEIGHT: 131 LBS | RESPIRATION RATE: 16 BRPM

## 2024-01-23 DIAGNOSIS — L65.9 NONSCARRING HAIR LOSS, UNSPECIFIED: ICD-10-CM

## 2024-01-23 DIAGNOSIS — R21 RASH AND OTHER NONSPECIFIC SKIN ERUPTION: ICD-10-CM

## 2024-01-23 PROCEDURE — 0502F SUBSEQUENT PRENATAL CARE: CPT

## 2024-01-23 PROCEDURE — 99213 OFFICE O/P EST LOW 20 MIN: CPT

## 2024-01-23 RX ORDER — CLOTRIMAZOLE AND BETAMETHASONE DIPROPIONATE 10; .5 MG/G; MG/G
1-0.05 CREAM TOPICAL TWICE DAILY
Qty: 1 | Refills: 1 | Status: ACTIVE | COMMUNITY
Start: 2024-01-23 | End: 1900-01-01

## 2024-01-30 LAB
25(OH)D3 SERPL-MCNC: 50.9 NG/ML
TSH SERPL-ACNC: 1.14 UIU/ML

## 2024-02-01 ENCOUNTER — APPOINTMENT (OUTPATIENT)
Dept: OBGYN | Facility: CLINIC | Age: 39
End: 2024-02-01
Payer: COMMERCIAL

## 2024-02-01 PROCEDURE — 99451 NTRPROF PH1/NTRNET/EHR 5/>: CPT

## 2024-02-04 NOTE — HISTORY OF PRESENT ILLNESS
[FreeTextEntry1] : Patient present with complaints of breast rash and itching. patient reports pumping at this time. Patient reports being prescribed clobetasol cream and said that her PCP stated the cream may be too strong for her to use long term and referred her back to OBGYN. Patient denies nipple cracking or bleeding. Patient also reports increased hair loss similar to when she was postpartum.

## 2024-02-04 NOTE — PLAN
[FreeTextEntry1] : Fungal infection vs contact dermatitis  eRx Clotrimazole-betamethasone  Follow up via TTM for lab results and for reassessment of symptoms

## 2024-02-09 NOTE — PROGRESS NOTE ADULT - SUBJECTIVE AND OBJECTIVE BOX
Patient seen at bedside resting comfortably offers no new complaints. not yet ambulating, paredes in place.  + flatus;  no bowel movement; tolerating clear liquid diet.  Pt both breast and bottle feeding. Pt denies headache, weakness, chest pain, shortness of breath, blurry vision or epigastric pain, N/V/D,  palpitations, worsening vaginal bleeding.    Vital Signs Last 24 Hrs  T(C): 36.3 (26 Aug 2023 04:35), Max: 37.5 (25 Aug 2023 21:40)  T(F): 97.4 (26 Aug 2023 04:35), Max: 99.5 (25 Aug 2023 21:40)  HR: 80 (26 Aug 2023 04:35) (72 - 103)  BP: 110/60 (26 Aug 2023 04:35) (105/53 - 131/58)  BP(mean): 68 (25 Aug 2023 23:40) (68 - 83)  RR: 18 (26 Aug 2023 04:35) (18 - 21)  SpO2: 95% (26 Aug 2023 04:35) (95% - 99%)    Parameters below as of 26 Aug 2023 04:35  Patient On (Oxygen Delivery Method): room air        Gen: A&O x 3, NAD  Chest: CTA B/L  Cardiac: S1,S2  RRR  Breast: Soft, nontender, nonengorged  Abdomen: +BS; soft; Nontender, nondistended; dressing removed incision C/D/I steri strips in place  Gyn: minimal lochia   Extremities: Nontender, venodynes in place, no calf tenderness                          11.3   12.44 )-----------( 189      ( 26 Aug 2023 06:00 )             33.4       A/P: 36yo admitted for miol for PROM POD #1 s/p STAT primary c/s @ 39.4wks for category III tracing, AMA, IVF pregnancy, pt stable  -Pain management as needed  -DVT ppx: OOB and ambulate, heparin   -f/u Rpt CBC   - f/u trial of void  -Advance diet to regular  -Encourage breastfeeding   -pt seen and evaluated with dr. jackson  -d/w dr. barrientos  Patient seen at bedside resting comfortably offers no new complaints. not yet ambulating, paredes in place.  + flatus;  no bowel movement; tolerating clear liquid diet.  Pt both breast and bottle feeding. Pt denies headache, weakness, chest pain, shortness of breath, blurry vision or epigastric pain, N/V/D,  palpitations, worsening vaginal bleeding.    Vital Signs Last 24 Hrs  T(C): 36.3 (26 Aug 2023 04:35), Max: 37.5 (25 Aug 2023 21:40)  T(F): 97.4 (26 Aug 2023 04:35), Max: 99.5 (25 Aug 2023 21:40)  HR: 80 (26 Aug 2023 04:35) (72 - 103)  BP: 110/60 (26 Aug 2023 04:35) (105/53 - 131/58)  BP(mean): 68 (25 Aug 2023 23:40) (68 - 83)  RR: 18 (26 Aug 2023 04:35) (18 - 21)  SpO2: 95% (26 Aug 2023 04:35) (95% - 99%)    Parameters below as of 26 Aug 2023 04:35  Patient On (Oxygen Delivery Method): room air        Gen: A&O x 3, NAD  Chest: CTA B/L  Cardiac: S1,S2  RRR  Breast: Soft, nontender, nonengorged  Abdomen: +BS; soft; Nontender, nondistended; dressing removed incision C/D/I steri strips in place  Gyn: minimal lochia   Extremities: Nontender, venodynes in place, no calf tenderness                          11.3   12.44 )-----------( 189      ( 26 Aug 2023 06:00 )             33.4       A/P: 38yo admitted for miol for PROM POD #1 s/p STAT primary c/s @ 39.4wks for category III tracing, AMA, IVF pregnancy, hepB reactive being followed by hepatology outpatient, pt stable  -Pain management as needed  -DVT ppx: OOB and ambulate, heparin   -f/u Rpt CBC   - f/u trial of void  -Advance diet to regular  -Encourage breastfeeding   -follow up with hepatologist outpatient   -pt seen and evaluated with dr. jackson  -d/w dr. barrientos  Yes

## 2024-02-13 NOTE — HISTORY OF PRESENT ILLNESS
[FreeTextEntry1] : Discussed lab results with patient; TSH and Vit D wnl.  Advised patient to follow up with PCP about potential alopecia symptoms. Follow up PRN or for annual exam.

## 2024-02-13 NOTE — REASON FOR VISIT
[Home] : at home, [unfilled] , at the time of the visit. [Medical Office: ([facility])___] : at the medical office located in  [Patient] : the patient [Self] : self [This encounter was initiated by telehealth (audio with video) and converted to telephone (audio only) due to technical difficulties.] : This encounter was initiated by telehealth (audio with video) and converted to telephone (audio only) due to technical difficulties.

## 2024-03-15 ENCOUNTER — APPOINTMENT (OUTPATIENT)
Dept: HEPATOLOGY | Facility: CLINIC | Age: 39
End: 2024-03-15
Payer: COMMERCIAL

## 2024-03-15 VITALS
TEMPERATURE: 97.3 F | OXYGEN SATURATION: 99 % | RESPIRATION RATE: 16 BRPM | SYSTOLIC BLOOD PRESSURE: 112 MMHG | BODY MASS INDEX: 23.92 KG/M2 | HEART RATE: 66 BPM | HEIGHT: 62 IN | WEIGHT: 130 LBS | DIASTOLIC BLOOD PRESSURE: 63 MMHG

## 2024-03-15 DIAGNOSIS — B18.1 CHRONIC VIRAL HEPATITIS B W/OUT DELTA-AGENT: ICD-10-CM

## 2024-03-15 DIAGNOSIS — R74.8 ABNORMAL LEVELS OF OTHER SERUM ENZYMES: ICD-10-CM

## 2024-03-15 PROCEDURE — 99214 OFFICE O/P EST MOD 30 MIN: CPT

## 2024-03-15 NOTE — PHYSICAL EXAM
[Scleral Icterus] : No Scleral Icterus [Spider Angioma] : No spider angioma(s) were observed [Abdominal  Ascites] : no ascites [Asterixis] : no asterixis observed [Liver Palpable ___ Finger Breadths Below Costal Margin] : was not palpable below costal margin [Jaundice] : No jaundice [Palmar Erythema] : no Palmar Erythema [General Appearance - Alert] : alert [General Appearance - In No Acute Distress] : in no acute distress [General Appearance - Well Nourished] : well nourished [General Appearance - Well Developed] : well developed [Sclera] : the sclera and conjunctiva were normal [Outer Ear] : the ears and nose were normal in appearance [Neck Appearance] : the appearance of the neck was normal [Respiration, Rhythm And Depth] : normal respiratory rhythm and effort [Exaggerated Use Of Accessory Muscles For Inspiration] : no accessory muscle use [Auscultation Breath Sounds / Voice Sounds] : lungs were clear to auscultation bilaterally [Heart Rate And Rhythm] : heart rate was normal and rhythm regular [Heart Sounds] : normal S1 and S2 [Edema] : there was no peripheral edema [Bowel Sounds] : normal bowel sounds [Abdomen Soft] : soft [Abdomen Tenderness] : non-tender [] : no hepato-splenomegaly [Abdomen Mass (___ Cm)] : no abdominal mass palpated [Abdomen Hernia] : no hernia was discovered [No CVA Tenderness] : no ~M costovertebral angle tenderness [No Spinal Tenderness] : no spinal tenderness [Abnormal Walk] : normal gait [Skin Color & Pigmentation] : normal skin color and pigmentation [FreeTextEntry1] : Grossly intact [Oriented To Time, Place, And Person] : oriented to person, place, and time [Impaired Insight] : insight and judgment were intact [Affect] : the affect was normal [Mood] : the mood was normal

## 2024-03-15 NOTE — REVIEW OF SYSTEMS
[Fever] : no fever [Chills] : no chills [Feeling Poorly] : not feeling poorly [Feeling Tired] : not feeling tired [Dysuria] : no dysuria [Itching] : no itching [Arthralgias] : no arthralgias [Confused] : no confusion [Negative] : Heme/Lymph

## 2024-03-15 NOTE — ASSESSMENT
[FreeTextEntry1] : 37 yo Female with Hx of chronic Hepatitis B (Dx at birth, mother has Hep B), reported that has been treated in her early 20s (for few month, was oral medication), prediabetes, prior exposure to hep C ? (now Hep C ab neg 1/24/23), was referred by Dr. Ellis for her Hep B and was seen for initial visit on 3/28/23 at 18 weeks of her pregnancy (1st pregnancy) and had normal ALT, low HBV DNA throughout.    # Chronic Hep B, eAb pos. - I have explained to the patient the natural history of hepatitis B virus infection including the potential progression to cirrhosis and risk of HCC.  - I have also discussed the current FDA approved hepatitis B treatment options, drug and drug interaction, risks and benefits, side effects and criteria of the treatment. - She has not met criteria for antiviral treatment at present.   - HIV neg (2023), HCV ab neg (2023), Hep D IgM neg (2023) - Hep A immune.   - I have ordered repeat blood work to check LFTs and HBV DNA and AFP for HCC screening. Patient will fax blood work report from PCP to review and contact our office to check if still needed to do the BW.  - Discussed HCC screening, patient was advised on q6 months HCC screening although not 50 yo yet. After extensive discussion, patient deferred q6 months, but agreed q12 months HCC screening. Next US abd 1/2025. AFP ordered for now.  - Fibroscan to assess for fibrosis / cirrhosis.    - Patient has been counseled on prevention of HBV transmission, including but not limited to: not sharing toothbrushes, razors, injection equipment, glucose testing equipment, covering open cuts and scratches, using barrier protection (if sexual partner not immune), testing household and sexual contacts and vaccinating if not immune, not donating blood, organs.  - Advised to check `s Hep B /  vaccination status. Patient reported the her baby has received the Hep B vaccine and HBIG.   RTC  depends on BW, but latest prior to next HCC screening due (latest 12/2023).   Addendum: Patient returned w/ [facility] lab results, and noted mild transaminase elevation. AST 61, ALT 47. Rest of CMP and CBC were normal. Patient reported that drinking Mother`s Milk herbal tea occasionally. Patient was advised to do the BW today, including repeat LFTs, HBV PCR, AFP. Advised to stop the herbal tea and avoid any herbal or OTC supplements. Patient to call office to discuss results next week and RTC depends on it.

## 2024-03-15 NOTE — HISTORY OF PRESENT ILLNESS
[FreeTextEntry1] : 37 yo Female with Hx of chronic Hepatitis B (Dx at birth, mother has Hep B), reported that has been treated in her early 20s (for few month, was oral medication), prediabetes, prior exposure to hep C ? (now Hep C ab neg 1/24/23), was referred by Dr. Ellis for her Hep B and was seen for initial visit on 3/28/23 at 18 weeks of her pregnancy (1st pregnancy).   Labs 3/28/23 showed normal CBC, normal ALT (15), low ALP (35), low HBV DNA  10IU/ml, HBeAb pos, Hep D IgM neg, HCV ab neg and HIV neg (1/24/23).   7/2023 follow up. Reported feeling well, no new complaints. She has not done the US abd yet. She was at 33 weeks of pregnancy.   She is here for follow up. She feels well, denies any complaints. She did US abd 1/2024 that was normal. She reportedly had blood work w/ PCP ([facility]) 2 months ago.  She reports that her baby received the vaccination.  She might plan pregnancy but only next year and aware that will need close monitoring.

## 2024-03-17 LAB
AFP-TM SERPL-MCNC: 1.9 NG/ML
ANION GAP SERPL CALC-SCNC: 11 MMOL/L
BUN SERPL-MCNC: 11 MG/DL
CALCIUM SERPL-MCNC: 9.6 MG/DL
CHLORIDE SERPL-SCNC: 103 MMOL/L
CO2 SERPL-SCNC: 27 MMOL/L
CREAT SERPL-MCNC: 0.71 MG/DL
EGFR: 111 ML/MIN/1.73M2
GLUCOSE SERPL-MCNC: 74 MG/DL
INR PPP: 0.83 RATIO
POTASSIUM SERPL-SCNC: 4.2 MMOL/L
PT BLD: 9.4 SEC
SODIUM SERPL-SCNC: 141 MMOL/L

## 2024-03-23 LAB
ALBUMIN SERPL ELPH-MCNC: 4.6 G/DL
ALP BLD-CCNC: 78 U/L
ALT SERPL-CCNC: 17 U/L
AST SERPL-CCNC: 15 U/L
BILIRUB DIRECT SERPL-MCNC: 0.2 MG/DL
BILIRUB INDIRECT SERPL-MCNC: 0.6 MG/DL
BILIRUB SERPL-MCNC: 0.8 MG/DL
HBV DNA # SERPL NAA+PROBE: 69 IU/ML
HCT VFR BLD CALC: 42.5 %
HEPB DNA PCR INT: DETECTED
HEPB DNA PCR LOG: 1.84 LOGIU/ML
HGB BLD-MCNC: 13.6 G/DL
MCHC RBC-ENTMCNC: 29.8 PG
MCHC RBC-ENTMCNC: 32 GM/DL
MCV RBC AUTO: 93 FL
PLATELET # BLD AUTO: 327 K/UL
PROT SERPL-MCNC: 6.8 G/DL
RBC # BLD: 4.57 M/UL
RBC # FLD: 13.2 %
WBC # FLD AUTO: 3.39 K/UL

## 2024-11-13 ENCOUNTER — APPOINTMENT (OUTPATIENT)
Dept: OBGYN | Facility: CLINIC | Age: 39
End: 2024-11-13

## 2024-12-25 PROBLEM — F10.90 ALCOHOL USE: Status: INACTIVE | Noted: 2023-01-24

## 2025-01-02 NOTE — LACTATION INITIAL EVALUATION - INTERVENTION OUTCOME
[facility] Tele-lactation program/verbalizes understanding/demonstrates understanding of teaching/needs met/Lactation team to follow up
verbalizes understanding/demonstrates understanding of teaching/good return demonstration/needs met
January

## 2025-01-27 ENCOUNTER — APPOINTMENT (OUTPATIENT)
Dept: ANTEPARTUM | Facility: CLINIC | Age: 40
End: 2025-01-27
Payer: COMMERCIAL

## 2025-01-27 ENCOUNTER — ASOB RESULT (OUTPATIENT)
Age: 40
End: 2025-01-27

## 2025-01-27 DIAGNOSIS — Z78.9 OTHER SPECIFIED HEALTH STATUS: ICD-10-CM

## 2025-01-27 PROCEDURE — 76811 OB US DETAILED SNGL FETUS: CPT

## 2025-01-27 PROCEDURE — 99214 OFFICE O/P EST MOD 30 MIN: CPT | Mod: 25

## 2025-01-29 ENCOUNTER — APPOINTMENT (OUTPATIENT)
Dept: MATERNAL FETAL MEDICINE | Facility: CLINIC | Age: 40
End: 2025-01-29

## 2025-01-30 ENCOUNTER — EMERGENCY (EMERGENCY)
Facility: HOSPITAL | Age: 40
LOS: 1 days | Discharge: HOME | End: 2025-01-30
Attending: EMERGENCY MEDICINE | Admitting: EMERGENCY MEDICINE
Payer: COMMERCIAL

## 2025-01-30 VITALS
DIASTOLIC BLOOD PRESSURE: 81 MMHG | TEMPERATURE: 98 F | RESPIRATION RATE: 16 BRPM | HEIGHT: 61 IN | SYSTOLIC BLOOD PRESSURE: 139 MMHG | OXYGEN SATURATION: 98 % | HEART RATE: 73 BPM | WEIGHT: 141.1 LBS

## 2025-01-30 PROCEDURE — 93010 ELECTROCARDIOGRAM REPORT: CPT

## 2025-01-30 PROCEDURE — 99284 EMERGENCY DEPT VISIT MOD MDM: CPT

## 2025-01-30 NOTE — ED ADULT TRIAGE NOTE - CHIEF COMPLAINT QUOTE
Pt c/o runny nose, SOB, "chest heaviness" for the past two weeks. Went to PCP and was told she had elevated d-dimer and to come to ED. Pt 23 weeks pregnant, , SILVANA May 26th. Pt went to [facility] for eval and was told by L&D to come to ED. Denies medical history, N/V, pelvic pains. Pt well appearing, breathing even and unlabored, ambulatory.

## 2025-01-30 NOTE — ED PROVIDER NOTE - CLINICAL SUMMARY MEDICAL DECISION MAKING FREE TEXT BOX
Srinivasan, PGY1: 39-year-old female no reported PMH currently 24 weeks pregnant G2, P1 presents from PCP office for PE r/o due to shortness of breath and elevated D-dimer to 1.6. i/s/o recent URI. Vitals are wnl. Physical exam is unremarkable with no signs of DVT. Will start with basics, cardiac labs (trop, bnp), and consider repeat d-dimer. Will consider imaging depending on results. Other differential includes infectious etiology (work-up with CXR, RVP) given recent URI. Dispo pending results. Srinivasan, PGY1: 39-year-old female no reported PMH currently 24 weeks pregnant G2, P1 presents from PCP office for PE r/o due to shortness of breath and elevated D-dimer to 1.6. i/s/o recent URI. Vitals are wnl. Physical exam is unremarkable with no signs of DVT. Low concern for PE per YEARS algorithm for d-dimer  in pregnant patients.  Will start with basics, cardiac labs (trop, bnp), and consider repeat d-dimer. Will consider imaging depending on results. Other differential includes infectious etiology (work-up with CXR, RVP) given recent URI. Dispo pending results.

## 2025-01-30 NOTE — ED PROVIDER NOTE - PHYSICAL EXAMINATION
Const: Awake, alert, no acute distress.  Well appearing.  Moving comfortably on stretcher.  HEENT: NC/AT.  Moist mucous membranes.  Eyes: Extraocular movements intact b/l.  No scleral icterus.  Neck: Full ROM without pain.  Cardiac: Extremities well perfused, normal coloration, no peripheral cyanosis.  No peripheral edema. CTAB.  Resp: Speaking in full sentences.  No evidence of respiratory distress. CTAB  Abd: Non-distended. non-tender. Gravid.  Skin: Normal coloration.  No rashes, abrasions or lacerations.  Neuro: Awake, alert & oriented x 3.  Moves all extremities symmetrically.  No obvious focal deficits.

## 2025-01-30 NOTE — ED PROVIDER NOTE - PATIENT PORTAL LINK FT
You can access the FollowMyHealth Patient Portal offered by [facility] by registering at the following website: [email or web address]. By joining [facility]’s FollowMyHealth portal, you will also be able to view your health information using other applications (apps) compatible with our system.

## 2025-01-30 NOTE — ED PROVIDER NOTE - ATTENDING CONTRIBUTION TO CARE
38 yo  who is 24 weeks to date here with nasal congestion, cough with white/clear sputum for the past 12 days. The pt says that she has been experiencing sporadic, non-exertional shortness of breath which lasts a few seconds. She notices it after she eats a heavy meal and after she saw the NP today in the office prior to arrival after the NP told her that she should come to the hospital for an elevated D-Dimer. The pt says she felt that she was a little anxious. The pt's Dimer was 1.6 but she is not having any chest pain or leg swelling at all, her vital are wnl. ecg is nsr with no St-T changes. There is no concern for PE at this time. So will discharge so that the patient may follow with pmd.     I performed a history and physical exam of the patient and discussed their management with the resident. I reviewed the resident's note and agree with the documented findings and plan of care. My medical decision making and observations are found above.

## 2025-01-30 NOTE — ED PROVIDER NOTE - PROGRESS NOTE DETAILS
Srinivasan, PGY1: Patient asymptomatic and walks without shortness of breath. BNP/Trop wnl. Patient well-appearing and hemodynamically stable. CXR was wnl. Will d/c.

## 2025-01-30 NOTE — ED PROVIDER NOTE - PRO INTERPRETER NEED 2
----- Message from Zee Arias sent at 7/12/2024 10:08 AM CDT -----  Regarding: Consultation  Name of Who is Calling:  Patient          What is the request in detail:  Please contact patient she would like to speak with Dr. Cardenas about a referral to a surgeon that she can't see Dr. Bowman            Can the clinic reply by [facility]:Yes            [facility][phone]   English

## 2025-01-30 NOTE — ED PROVIDER NOTE - OBJECTIVE STATEMENT
39 year-old female no reported PMH currently 24 weeks pregnant G2, P1 presents from PCP office due to shortness of breath and elevated D-dimer to 1.6.  She endorses a cold for the past 12 days with congestion and cough with associated shortness of breath.  Congestion and cough has improved but the intermittent shortness of breath has lingered.  She is at the shortness of breath happens randomly with intermittent chest pressure.  The persistent SOB.  Her so she went to her PCP office for evaluation.  She had a negative COVID/flu swab yesterday.  Endorses productive cough with minimal flecks of blood.  Denies fever, n/v, abdominal pain, vaginal bleeding, dysuria, constipation, diarrhea.

## 2025-01-30 NOTE — ED ADULT NURSE NOTE - OBJECTIVE STATEMENT
Pt is A&O x 4, ambulatory w/o assistance, shows no signs of acute distress. Sent to [facility] by her OBGYN for abnormal D-Dimer results. Pt is , currently 23 weeks pregnant, SILVANA . Currently endorsing mild SOB and midsternal chest discomfort. States she is "getting over a cold". Denies n/v/d, dizziness/vision changes, fevers/chills or headache. Also denies recent travel or sick contacts. Placed on continuous telemetry and SpO2 monitoring. VSS. Respirations even and unlabored. Safety maintained. Awaiting further instruction.

## 2025-01-30 NOTE — ED PROVIDER NOTE - NSFOLLOWUPINSTRUCTIONS_ED_ALL_ED_FT
You will need further medical care and evaluation.     Your cardiac markers and chest x-ray were within normal limits. The Respiratory viral panel was negative.     Please follow-up with your primary care and OBGYN for routine care and further evaluation.    If you develop worsening shortness of breath, chest pain, nausea/vomiting, lightheadedness, please return to the emergency department    Return for any persistent, worsening symptoms, or ANY concerns at all.

## 2025-01-31 VITALS
DIASTOLIC BLOOD PRESSURE: 76 MMHG | OXYGEN SATURATION: 99 % | TEMPERATURE: 98 F | SYSTOLIC BLOOD PRESSURE: 115 MMHG | HEART RATE: 68 BPM | RESPIRATION RATE: 17 BRPM

## 2025-01-31 LAB
ADD ON TEST-SPECIMEN IN LAB: SIGNIFICANT CHANGE UP
ALBUMIN SERPL ELPH-MCNC: 3.9 G/DL — SIGNIFICANT CHANGE UP (ref 3.3–5)
ALP SERPL-CCNC: 50 U/L — SIGNIFICANT CHANGE UP (ref 40–120)
ALT FLD-CCNC: 11 U/L — SIGNIFICANT CHANGE UP (ref 4–33)
ANION GAP SERPL CALC-SCNC: 14 MMOL/L — SIGNIFICANT CHANGE UP (ref 7–14)
AST SERPL-CCNC: 18 U/L — SIGNIFICANT CHANGE UP (ref 4–32)
BASOPHILS # BLD AUTO: 0.03 K/UL — SIGNIFICANT CHANGE UP (ref 0–0.2)
BASOPHILS NFR BLD AUTO: 0.4 % — SIGNIFICANT CHANGE UP (ref 0–2)
BILIRUB SERPL-MCNC: 0.2 MG/DL — SIGNIFICANT CHANGE UP (ref 0.2–1.2)
BUN SERPL-MCNC: 8 MG/DL — SIGNIFICANT CHANGE UP (ref 7–23)
CALCIUM SERPL-MCNC: 9.6 MG/DL — SIGNIFICANT CHANGE UP (ref 8.4–10.5)
CHLORIDE SERPL-SCNC: 102 MMOL/L — SIGNIFICANT CHANGE UP (ref 98–107)
CO2 SERPL-SCNC: 21 MMOL/L — LOW (ref 22–31)
CREAT SERPL-MCNC: 0.46 MG/DL — LOW (ref 0.5–1.3)
EGFR: 124 ML/MIN/1.73M2 — SIGNIFICANT CHANGE UP
EOSINOPHIL # BLD AUTO: 0.18 K/UL — SIGNIFICANT CHANGE UP (ref 0–0.5)
EOSINOPHIL NFR BLD AUTO: 2.6 % — SIGNIFICANT CHANGE UP (ref 0–6)
FLUAV AG NPH QL: SIGNIFICANT CHANGE UP
FLUBV AG NPH QL: SIGNIFICANT CHANGE UP
GLUCOSE SERPL-MCNC: 86 MG/DL — SIGNIFICANT CHANGE UP (ref 70–99)
HCT VFR BLD CALC: 34.2 % — LOW (ref 34.5–45)
HGB BLD-MCNC: 12.1 G/DL — SIGNIFICANT CHANGE UP (ref 11.5–15.5)
IANC: 4.28 K/UL — SIGNIFICANT CHANGE UP (ref 1.8–7.4)
IMM GRANULOCYTES NFR BLD AUTO: 0.4 % — SIGNIFICANT CHANGE UP (ref 0–0.9)
LYMPHOCYTES # BLD AUTO: 1.93 K/UL — SIGNIFICANT CHANGE UP (ref 1–3.3)
LYMPHOCYTES # BLD AUTO: 28.1 % — SIGNIFICANT CHANGE UP (ref 13–44)
MCHC RBC-ENTMCNC: 32 PG — SIGNIFICANT CHANGE UP (ref 27–34)
MCHC RBC-ENTMCNC: 35.4 G/DL — SIGNIFICANT CHANGE UP (ref 32–36)
MCV RBC AUTO: 90.5 FL — SIGNIFICANT CHANGE UP (ref 80–100)
MONOCYTES # BLD AUTO: 0.42 K/UL — SIGNIFICANT CHANGE UP (ref 0–0.9)
MONOCYTES NFR BLD AUTO: 6.1 % — SIGNIFICANT CHANGE UP (ref 2–14)
NEUTROPHILS # BLD AUTO: 4.28 K/UL — SIGNIFICANT CHANGE UP (ref 1.8–7.4)
NEUTROPHILS NFR BLD AUTO: 62.4 % — SIGNIFICANT CHANGE UP (ref 43–77)
NRBC # BLD AUTO: 0.03 K/UL — HIGH (ref 0–0)
NRBC # BLD: 0 /100 WBCS — SIGNIFICANT CHANGE UP (ref 0–0)
NRBC # FLD: 0.03 K/UL — HIGH (ref 0–0)
NRBC BLD-RTO: 0 /100 WBCS — SIGNIFICANT CHANGE UP (ref 0–0)
NT-PROBNP SERPL-SCNC: 39 PG/ML — SIGNIFICANT CHANGE UP
PLATELET # BLD AUTO: 236 K/UL — SIGNIFICANT CHANGE UP (ref 150–400)
POTASSIUM SERPL-MCNC: 3.8 MMOL/L — SIGNIFICANT CHANGE UP (ref 3.5–5.3)
POTASSIUM SERPL-SCNC: 3.8 MMOL/L — SIGNIFICANT CHANGE UP (ref 3.5–5.3)
PROT SERPL-MCNC: 6.6 G/DL — SIGNIFICANT CHANGE UP (ref 6–8.3)
RBC # BLD: 3.78 M/UL — LOW (ref 3.8–5.2)
RBC # FLD: 12.2 % — SIGNIFICANT CHANGE UP (ref 10.3–14.5)
RSV RNA NPH QL NAA+NON-PROBE: SIGNIFICANT CHANGE UP
SARS-COV-2 RNA SPEC QL NAA+PROBE: SIGNIFICANT CHANGE UP
SODIUM SERPL-SCNC: 137 MMOL/L — SIGNIFICANT CHANGE UP (ref 135–145)
TROPONIN T, HIGH SENSITIVITY RESULT: <6 NG/L — SIGNIFICANT CHANGE UP
WBC # BLD: 6.87 K/UL — SIGNIFICANT CHANGE UP (ref 3.8–10.5)
WBC # FLD AUTO: 6.87 K/UL — SIGNIFICANT CHANGE UP (ref 3.8–10.5)

## 2025-01-31 PROCEDURE — 71046 X-RAY EXAM CHEST 2 VIEWS: CPT | Mod: 26

## 2025-01-31 NOTE — ED ADULT NURSE REASSESSMENT NOTE - NS ED NURSE REASSESS COMMENT FT1
Break RN: Pt received in stretcher in room 26. Pt resting comfortably. pt offered no complains at present. respiration even and non-labored. Denies shortness of breath at this time. 20G IV placed to LAC, lab drawn and sent. in NAD.

## 2025-02-05 ENCOUNTER — APPOINTMENT (OUTPATIENT)
Dept: PEDIATRIC CARDIOLOGY | Facility: CLINIC | Age: 40
End: 2025-02-05
Payer: COMMERCIAL

## 2025-02-05 PROCEDURE — 76820 UMBILICAL ARTERY ECHO: CPT

## 2025-02-05 PROCEDURE — 99212 OFFICE O/P EST SF 10 MIN: CPT | Mod: 25

## 2025-02-05 PROCEDURE — 76821 MIDDLE CEREBRAL ARTERY ECHO: CPT

## 2025-02-05 PROCEDURE — 76827 ECHO EXAM OF FETAL HEART: CPT

## 2025-02-05 PROCEDURE — 93325 DOPPLER ECHO COLOR FLOW MAPG: CPT | Mod: 59

## 2025-02-05 PROCEDURE — 76825 ECHO EXAM OF FETAL HEART: CPT

## 2025-02-24 ENCOUNTER — APPOINTMENT (OUTPATIENT)
Dept: ANTEPARTUM | Facility: CLINIC | Age: 40
End: 2025-02-24

## 2025-02-26 ENCOUNTER — APPOINTMENT (OUTPATIENT)
Dept: HEPATOLOGY | Facility: CLINIC | Age: 40
End: 2025-02-26